# Patient Record
Sex: MALE | Race: WHITE | NOT HISPANIC OR LATINO | Employment: OTHER | URBAN - NONMETROPOLITAN AREA
[De-identification: names, ages, dates, MRNs, and addresses within clinical notes are randomized per-mention and may not be internally consistent; named-entity substitution may affect disease eponyms.]

---

## 2021-02-11 ENCOUNTER — HOSPITAL ENCOUNTER (EMERGENCY)
Facility: HOSPITAL | Age: 36
Discharge: HOME | End: 2021-02-12
Attending: EMERGENCY MEDICINE
Payer: MEDICAID

## 2021-02-11 DIAGNOSIS — K52.9 GASTROENTERITIS: Primary | ICD-10-CM

## 2021-02-11 PROCEDURE — 99284 EMERGENCY DEPT VISIT MOD MDM: CPT | Mod: 25

## 2021-02-12 VITALS
RESPIRATION RATE: 14 BRPM | DIASTOLIC BLOOD PRESSURE: 74 MMHG | BODY MASS INDEX: 27.06 KG/M2 | HEART RATE: 70 BPM | SYSTOLIC BLOOD PRESSURE: 123 MMHG | WEIGHT: 189 LBS | TEMPERATURE: 98 F | OXYGEN SATURATION: 98 % | HEIGHT: 70 IN

## 2021-02-12 LAB
CTP QC/QA: YES
GROUP A STREP, MOLECULAR: NEGATIVE
SARS-COV-2 RDRP RESP QL NAA+PROBE: NEGATIVE

## 2021-02-12 PROCEDURE — 96375 TX/PRO/DX INJ NEW DRUG ADDON: CPT

## 2021-02-12 PROCEDURE — 96361 HYDRATE IV INFUSION ADD-ON: CPT

## 2021-02-12 PROCEDURE — 87651 STREP A DNA AMP PROBE: CPT

## 2021-02-12 PROCEDURE — U0002 COVID-19 LAB TEST NON-CDC: HCPCS | Performed by: EMERGENCY MEDICINE

## 2021-02-12 PROCEDURE — 96374 THER/PROPH/DIAG INJ IV PUSH: CPT

## 2021-02-12 PROCEDURE — 25000003 PHARM REV CODE 250: Performed by: EMERGENCY MEDICINE

## 2021-02-12 PROCEDURE — 63600175 PHARM REV CODE 636 W HCPCS: Performed by: EMERGENCY MEDICINE

## 2021-02-12 RX ORDER — DEXAMETHASONE SODIUM PHOSPHATE 4 MG/ML
4 INJECTION, SOLUTION INTRA-ARTICULAR; INTRALESIONAL; INTRAMUSCULAR; INTRAVENOUS; SOFT TISSUE
Status: COMPLETED | OUTPATIENT
Start: 2021-02-12 | End: 2021-02-12

## 2021-02-12 RX ORDER — IBUPROFEN 600 MG/1
600 TABLET ORAL
Status: COMPLETED | OUTPATIENT
Start: 2021-02-12 | End: 2021-02-12

## 2021-02-12 RX ORDER — ONDANSETRON 2 MG/ML
4 INJECTION INTRAMUSCULAR; INTRAVENOUS
Status: COMPLETED | OUTPATIENT
Start: 2021-02-12 | End: 2021-02-12

## 2021-02-12 RX ORDER — ONDANSETRON 4 MG/1
4 TABLET, ORALLY DISINTEGRATING ORAL EVERY 6 HOURS PRN
Qty: 20 TABLET | Refills: 0 | Status: SHIPPED | OUTPATIENT
Start: 2021-02-12 | End: 2023-06-09

## 2021-02-12 RX ADMIN — SODIUM CHLORIDE 1000 ML: 0.9 INJECTION, SOLUTION INTRAVENOUS at 01:02

## 2021-02-12 RX ADMIN — IBUPROFEN 600 MG: 600 TABLET ORAL at 01:02

## 2021-02-12 RX ADMIN — DEXAMETHASONE SODIUM PHOSPHATE 4 MG: 4 INJECTION, SOLUTION INTRA-ARTICULAR; INTRALESIONAL; INTRAMUSCULAR; INTRAVENOUS; SOFT TISSUE at 01:02

## 2021-02-12 RX ADMIN — ONDANSETRON 4 MG: 2 INJECTION INTRAMUSCULAR; INTRAVENOUS at 01:02

## 2021-03-07 ENCOUNTER — HOSPITAL ENCOUNTER (EMERGENCY)
Facility: HOSPITAL | Age: 36
Discharge: HOME | End: 2021-03-07
Attending: EMERGENCY MEDICINE
Payer: MEDICAID

## 2021-03-07 VITALS
SYSTOLIC BLOOD PRESSURE: 150 MMHG | OXYGEN SATURATION: 98 % | DIASTOLIC BLOOD PRESSURE: 94 MMHG | BODY MASS INDEX: 27.2 KG/M2 | HEIGHT: 70 IN | WEIGHT: 190 LBS | TEMPERATURE: 98 F | RESPIRATION RATE: 18 BRPM | HEART RATE: 101 BPM

## 2021-03-07 DIAGNOSIS — K04.7 DENTAL ABSCESS: Primary | ICD-10-CM

## 2021-03-07 PROCEDURE — 63600175 PHARM REV CODE 636 W HCPCS: Performed by: EMERGENCY MEDICINE

## 2021-03-07 PROCEDURE — 96372 THER/PROPH/DIAG INJ SC/IM: CPT

## 2021-03-07 PROCEDURE — 99284 EMERGENCY DEPT VISIT MOD MDM: CPT | Mod: 25

## 2021-03-07 PROCEDURE — 25000003 PHARM REV CODE 250: Performed by: EMERGENCY MEDICINE

## 2021-03-07 RX ORDER — AMOXICILLIN 250 MG/1
250 CAPSULE ORAL
COMMUNITY
End: 2021-03-07

## 2021-03-07 RX ORDER — DICLOFENAC SODIUM 75 MG/1
75 TABLET, DELAYED RELEASE ORAL 2 TIMES DAILY PRN
Qty: 10 TABLET | Refills: 0 | Status: SHIPPED | OUTPATIENT
Start: 2021-03-07

## 2021-03-07 RX ORDER — CLINDAMYCIN HYDROCHLORIDE 300 MG/1
300 CAPSULE ORAL EVERY 6 HOURS
Qty: 28 CAPSULE | Refills: 0 | Status: SHIPPED | OUTPATIENT
Start: 2021-03-07

## 2021-03-07 RX ORDER — CLINDAMYCIN PHOSPHATE 150 MG/ML
600 INJECTION, SOLUTION INTRAVENOUS
Status: COMPLETED | OUTPATIENT
Start: 2021-03-07 | End: 2021-03-07

## 2021-03-07 RX ORDER — KETOROLAC TROMETHAMINE 30 MG/ML
30 INJECTION, SOLUTION INTRAMUSCULAR; INTRAVENOUS
Status: COMPLETED | OUTPATIENT
Start: 2021-03-07 | End: 2021-03-07

## 2021-03-07 RX ADMIN — CLINDAMYCIN PHOSPHATE 600 MG: 150 INJECTION, SOLUTION INTRAMUSCULAR; INTRAVENOUS at 01:03

## 2021-03-07 RX ADMIN — KETOROLAC TROMETHAMINE 30 MG: 30 INJECTION, SOLUTION INTRAMUSCULAR at 01:03

## 2022-06-14 ENCOUNTER — HOSPITAL ENCOUNTER (OUTPATIENT)
Dept: RADIOLOGY | Facility: HOSPITAL | Age: 37
Discharge: HOME | End: 2022-06-14
Attending: NURSE PRACTITIONER
Payer: MEDICAID

## 2022-06-14 ENCOUNTER — OFFICE VISIT (OUTPATIENT)
Dept: ORTHOPEDICS | Facility: CLINIC | Age: 37
End: 2022-06-14
Payer: MEDICAID

## 2022-06-14 VITALS
HEIGHT: 70 IN | OXYGEN SATURATION: 97 % | BODY MASS INDEX: 28.63 KG/M2 | HEART RATE: 78 BPM | SYSTOLIC BLOOD PRESSURE: 138 MMHG | WEIGHT: 200 LBS | DIASTOLIC BLOOD PRESSURE: 86 MMHG

## 2022-06-14 DIAGNOSIS — G56.21 CUBITAL TUNNEL SYNDROME ON RIGHT: ICD-10-CM

## 2022-06-14 DIAGNOSIS — M25.521 RIGHT ELBOW PAIN: ICD-10-CM

## 2022-06-14 DIAGNOSIS — G89.29 ELBOW PAIN, CHRONIC, RIGHT: Primary | ICD-10-CM

## 2022-06-14 DIAGNOSIS — M25.521 RIGHT ELBOW PAIN: Primary | ICD-10-CM

## 2022-06-14 DIAGNOSIS — M25.521 ELBOW PAIN, CHRONIC, RIGHT: Primary | ICD-10-CM

## 2022-06-14 PROCEDURE — 3008F PR BODY MASS INDEX (BMI) DOCUMENTED: ICD-10-PCS | Mod: CPTII,,, | Performed by: NURSE PRACTITIONER

## 2022-06-14 PROCEDURE — 1159F PR MEDICATION LIST DOCUMENTED IN MEDICAL RECORD: ICD-10-PCS | Mod: CPTII,,, | Performed by: NURSE PRACTITIONER

## 2022-06-14 PROCEDURE — 73080 X-RAY EXAM OF ELBOW: CPT | Mod: TC,RT

## 2022-06-14 PROCEDURE — 99203 PR OFFICE/OUTPT VISIT, NEW, LEVL III, 30-44 MIN: ICD-10-PCS | Mod: S$PBB,,, | Performed by: NURSE PRACTITIONER

## 2022-06-14 PROCEDURE — 99999 PR PBB SHADOW E&M-EST. PATIENT-LVL III: ICD-10-PCS | Mod: PBBFAC,,, | Performed by: NURSE PRACTITIONER

## 2022-06-14 PROCEDURE — 3008F BODY MASS INDEX DOCD: CPT | Mod: CPTII,,, | Performed by: NURSE PRACTITIONER

## 2022-06-14 PROCEDURE — 3075F SYST BP GE 130 - 139MM HG: CPT | Mod: CPTII,,, | Performed by: NURSE PRACTITIONER

## 2022-06-14 PROCEDURE — 99203 OFFICE O/P NEW LOW 30 MIN: CPT | Mod: S$PBB,,, | Performed by: NURSE PRACTITIONER

## 2022-06-14 PROCEDURE — 1160F PR REVIEW ALL MEDS BY PRESCRIBER/CLIN PHARMACIST DOCUMENTED: ICD-10-PCS | Mod: CPTII,,, | Performed by: NURSE PRACTITIONER

## 2022-06-14 PROCEDURE — 1159F MED LIST DOCD IN RCRD: CPT | Mod: CPTII,,, | Performed by: NURSE PRACTITIONER

## 2022-06-14 PROCEDURE — 99213 OFFICE O/P EST LOW 20 MIN: CPT | Mod: PBBFAC | Performed by: NURSE PRACTITIONER

## 2022-06-14 PROCEDURE — 3079F PR MOST RECENT DIASTOLIC BLOOD PRESSURE 80-89 MM HG: ICD-10-PCS | Mod: CPTII,,, | Performed by: NURSE PRACTITIONER

## 2022-06-14 PROCEDURE — 99999 PR PBB SHADOW E&M-EST. PATIENT-LVL III: CPT | Mod: PBBFAC,,, | Performed by: NURSE PRACTITIONER

## 2022-06-14 PROCEDURE — 3079F DIAST BP 80-89 MM HG: CPT | Mod: CPTII,,, | Performed by: NURSE PRACTITIONER

## 2022-06-14 PROCEDURE — 1160F RVW MEDS BY RX/DR IN RCRD: CPT | Mod: CPTII,,, | Performed by: NURSE PRACTITIONER

## 2022-06-14 PROCEDURE — 3075F PR MOST RECENT SYSTOLIC BLOOD PRESS GE 130-139MM HG: ICD-10-PCS | Mod: CPTII,,, | Performed by: NURSE PRACTITIONER

## 2022-06-14 NOTE — PROGRESS NOTES
"Subjective:      Syed Cm is a 36 y.o. male who presents with right elbow pain and numbness in the fourth and fifth digits of the right hand. He is referred by [facility]. By history he had a right Ulna Nerve Transposition done per Dr Murphy around 20 years ago. He states that the elbow did well for years. He states a few years ago, he lifted a gallon of milk and felt instant fire like sensation in his right arm and into the hand. He states that he has had persistent numbness in the fourth and fifth fingers of the right hand since that incident. He denies any loss of elbow or wrist ROM. He denies any neck pain. Current symptoms include: mild elbow pain and persistent nubness in the fourth and fifth fingers. Pain is aggravated by: nothing in particular. Symptoms have gradually worsened. Patient has had prior elbow problems. Evaluation to date: plain films, which showed DJD changes. Treatment to date: OTC analgesics.    The following portions of the patient's history were reviewed and updated as appropriate: allergies, current medications, past family history, past medical history, past social history, past surgical history and problem list.    Review of Systems  Constitutional: negative  Musculoskeletal:negative  Neurological: negative  Behavioral/Psych: negative     Objective:      /86 (BP Location: Right arm, Patient Position: Sitting, BP Method: Large (Automatic))   Pulse 78   Ht 5' 10" (1.778 m)   Wt 90.7 kg (200 lb)   SpO2 97%   BMI 28.70 kg/m²      NVI  Right elbow: No deformity noted, previous scar noted from ulna nerve surgery.   He has full active ROM- ext, flex, supination and pronation.   Mild TTP ulna cubital tunnel.   Non tender over the lateral condyle and olecranon. No bursal swelling.   No instability.   Pos Tinels at elbow  Neg tinels and phalens at wrist  Normal AROM wrist.   Patchy numbness 4/5 fingers  Mild hand grasp weakness.    Left elbow:  without deformity and full active ROM. " Non tender over the condyles and olecranon.   No instability. Normal pronation and supination.   Neg tinels at elbow and wrist.   Normal AROM wrist.   No hand grasp weakness.    Cap refill was brisk.     X-ray: Ordered and reviewed RT elbow films.   No acute fractures. Mild DJD changes noted.     Assessment:     RT elbow pain, cubital tunnel syndrome    Plan:     1. Treatment options discussed: ice, topical Nsaid, rest and elbow sleeve.   2. [facility] RT UE for ulna neuropathy. (Hx previous ulna nerve surgery)  3. Home ROM as discussed- ROM, stretching and ulna nerve glides. Cubital tunnel brace at night time as discussed.   4. RTC post EMG for review.   5. He had no further questions.

## 2022-06-15 ENCOUNTER — TELEPHONE (OUTPATIENT)
Dept: ORTHOPEDICS | Facility: CLINIC | Age: 37
End: 2022-06-15
Payer: MEDICAID

## 2022-06-15 NOTE — TELEPHONE ENCOUNTER
Called pt to let him know that we were able to schedule his EMG @ [facility] for 6/22 @ 7:30am; no answer; left VM

## 2022-06-20 ENCOUNTER — HOSPITAL ENCOUNTER (EMERGENCY)
Facility: HOSPITAL | Age: 37
Discharge: HOME | End: 2022-06-20
Attending: STUDENT IN AN ORGANIZED HEALTH CARE EDUCATION/TRAINING PROGRAM
Payer: MEDICAID

## 2022-06-20 VITALS
DIASTOLIC BLOOD PRESSURE: 90 MMHG | HEART RATE: 83 BPM | WEIGHT: 200 LBS | BODY MASS INDEX: 28.7 KG/M2 | OXYGEN SATURATION: 99 % | SYSTOLIC BLOOD PRESSURE: 140 MMHG | RESPIRATION RATE: 18 BRPM | TEMPERATURE: 98 F

## 2022-06-20 DIAGNOSIS — S39.012A STRAIN OF LUMBAR REGION, INITIAL ENCOUNTER: Primary | ICD-10-CM

## 2022-06-20 PROCEDURE — 96372 THER/PROPH/DIAG INJ SC/IM: CPT | Performed by: NURSE PRACTITIONER

## 2022-06-20 PROCEDURE — 99284 EMERGENCY DEPT VISIT MOD MDM: CPT | Mod: 25

## 2022-06-20 PROCEDURE — 63600175 PHARM REV CODE 636 W HCPCS: Performed by: NURSE PRACTITIONER

## 2022-06-20 RX ORDER — PREDNISONE 20 MG/1
40 TABLET ORAL DAILY
Qty: 10 TABLET | Refills: 0 | Status: SHIPPED | OUTPATIENT
Start: 2022-06-20 | End: 2022-06-25

## 2022-06-20 RX ORDER — CYCLOBENZAPRINE HCL 10 MG
10 TABLET ORAL 3 TIMES DAILY PRN
Qty: 12 TABLET | Refills: 0 | Status: SHIPPED | OUTPATIENT
Start: 2022-06-20 | End: 2022-06-24

## 2022-06-20 RX ORDER — DEXAMETHASONE SODIUM PHOSPHATE 4 MG/ML
8 INJECTION, SOLUTION INTRA-ARTICULAR; INTRALESIONAL; INTRAMUSCULAR; INTRAVENOUS; SOFT TISSUE
Status: COMPLETED | OUTPATIENT
Start: 2022-06-20 | End: 2022-06-20

## 2022-06-20 RX ADMIN — DEXAMETHASONE SODIUM PHOSPHATE 8 MG: 4 INJECTION INTRA-ARTICULAR; INTRALESIONAL; INTRAMUSCULAR; INTRAVENOUS; SOFT TISSUE at 12:06

## 2022-06-20 NOTE — ED PROVIDER NOTES
"Encounter Date: 6/20/2022       History     Chief Complaint   Patient presents with    Back Pain     Complains of lower back pain 10/10 since yesterday. States he was cleaning out the pool and "threw back out." Denies urinary symptoms.     This is a 36-year-old white male with noncontributory past medical history who presents to the emergency department with complaints of lower back pain since yesterday.  Patient reports that while cleaning a pool he began with low back tightness and decreased range of motion.  Patient reports constant lower back pain exacerbated by movement.  He reports that he occasionally experiences something like this a couple times a year .  Patient denies radicular symptoms, numbness/tingling, or bladder/bowel dysfunction.  Pain unrelieved with Tylenol and Aleve.        Review of patient's allergies indicates:  No Known Allergies  Past Medical History:   Diagnosis Date    Dental abscess     Hiatal hernia      Past Surgical History:   Procedure Laterality Date    ULNAR NERVE REPAIR Right      No family history on file.  Social History     Tobacco Use    Smoking status: Never Smoker   Substance Use Topics    Alcohol use: Never    Drug use: Never     Review of Systems   Genitourinary: Negative.    Musculoskeletal: Positive for back pain. Negative for gait problem and neck pain.   Skin: Negative.    Neurological: Negative for numbness.       Physical Exam     Initial Vitals [06/20/22 1229]   BP Pulse Resp Temp SpO2   (!) 140/90 83 18 97.8 °F (36.6 °C) 99 %      MAP       --         Physical Exam    Nursing note and vitals reviewed.  Constitutional: He appears well-developed and well-nourished. He is active. No distress.   HENT:   Head: Normocephalic and atraumatic.   Eyes: EOM are normal. Pupils are equal, round, and reactive to light.   Neck: Neck supple.   Normal range of motion.  Cardiovascular: Normal rate, regular rhythm and normal heart sounds.   Pulmonary/Chest: Breath sounds " normal. No respiratory distress.   Musculoskeletal:         General: No tenderness or edema.      Cervical back: Normal range of motion and neck supple.      Comments: The low back appears normal upon inspection, no rash or discoloration noted.  Patient is nontender in the lumbar paraspinal or central vertebral prominence regions.  Muscle strength to bilateral lower extremities is 5/5 in neurovascularly intact.     Neurological: He is alert and oriented to person, place, and time. He has normal strength. GCS eye subscore is 4. GCS verbal subscore is 5. GCS motor subscore is 6.   Skin: Skin is warm and dry. Capillary refill takes less than 2 seconds.   Psychiatric: He has a normal mood and affect. His behavior is normal. Thought content normal.         ED Course   Procedures  Labs Reviewed - No data to display       Imaging Results    None          Medications   dexamethasone injection 8 mg (has no administration in time range)                          Clinical Impression:   Final diagnoses:  [S39.012A] Strain of lumbar region, initial encounter (Primary)          ED Disposition Condition    Discharge Stable        ED Prescriptions     Medication Sig Dispense Start Date End Date Auth. Provider    predniSONE (DELTASONE) 20 MG tablet Take 2 tablets (40 mg total) by mouth once daily. for 5 days 10 tablet 6/20/2022 6/25/2022 Anastasia Hooks NP    cyclobenzaprine (FLEXERIL) 10 MG tablet Take 1 tablet (10 mg total) by mouth 3 (three) times daily as needed for Muscle spasms (as needed for muscle spasms, low back pain). 12 tablet 6/20/2022 6/24/2022 Anastasia Hooks NP        Follow-up Information     Follow up With Specialties Details Why Contact Info    PCP Follow UP  Schedule an appointment as soon as possible for a visit in 2 days for follow-up, for re-evaluation of today's complaint            Anastasia Hooks NP  06/20/22 5650

## 2022-06-20 NOTE — DISCHARGE INSTRUCTIONS
Take medications as directed, and you may also take Aleve and Tylenol as directed.  Follow-up with your primary doctor in 1 week and return to the emergency room for worsening condition.  And return to the emergency room for worsening condition

## 2022-07-05 ENCOUNTER — HOSPITAL ENCOUNTER (EMERGENCY)
Facility: HOSPITAL | Age: 37
Discharge: HOME | End: 2022-07-05
Attending: EMERGENCY MEDICINE
Payer: MEDICAID

## 2022-07-05 VITALS
OXYGEN SATURATION: 99 % | SYSTOLIC BLOOD PRESSURE: 150 MMHG | RESPIRATION RATE: 16 BRPM | WEIGHT: 197 LBS | TEMPERATURE: 98 F | HEART RATE: 70 BPM | BODY MASS INDEX: 28.27 KG/M2 | DIASTOLIC BLOOD PRESSURE: 98 MMHG

## 2022-07-05 DIAGNOSIS — R51.9 NONINTRACTABLE HEADACHE, UNSPECIFIED CHRONICITY PATTERN, UNSPECIFIED HEADACHE TYPE: Primary | ICD-10-CM

## 2022-07-05 PROCEDURE — 99284 EMERGENCY DEPT VISIT MOD MDM: CPT | Mod: 25

## 2022-07-05 PROCEDURE — 96372 THER/PROPH/DIAG INJ SC/IM: CPT | Performed by: CLINICAL NURSE SPECIALIST

## 2022-07-05 PROCEDURE — 63600175 PHARM REV CODE 636 W HCPCS: Performed by: CLINICAL NURSE SPECIALIST

## 2022-07-05 RX ORDER — DIPHENHYDRAMINE HYDROCHLORIDE 50 MG/ML
25 INJECTION INTRAMUSCULAR; INTRAVENOUS ONCE
Status: COMPLETED | OUTPATIENT
Start: 2022-07-05 | End: 2022-07-05

## 2022-07-05 RX ORDER — IBUPROFEN 800 MG/1
800 TABLET ORAL EVERY 6 HOURS PRN
Qty: 20 TABLET | Refills: 0 | Status: SHIPPED | OUTPATIENT
Start: 2022-07-05

## 2022-07-05 RX ORDER — PROCHLORPERAZINE EDISYLATE 5 MG/ML
10 INJECTION INTRAMUSCULAR; INTRAVENOUS ONCE
Status: COMPLETED | OUTPATIENT
Start: 2022-07-05 | End: 2022-07-05

## 2022-07-05 RX ORDER — KETOROLAC TROMETHAMINE 30 MG/ML
60 INJECTION, SOLUTION INTRAMUSCULAR; INTRAVENOUS ONCE
Status: COMPLETED | OUTPATIENT
Start: 2022-07-05 | End: 2022-07-05

## 2022-07-05 RX ADMIN — KETOROLAC TROMETHAMINE 60 MG: 30 INJECTION, SOLUTION INTRAMUSCULAR at 08:07

## 2022-07-05 RX ADMIN — DIPHENHYDRAMINE HYDROCHLORIDE 25 MG: 50 INJECTION, SOLUTION INTRAMUSCULAR; INTRAVENOUS at 08:07

## 2022-07-05 RX ADMIN — PROCHLORPERAZINE EDISYLATE 10 MG: 5 INJECTION INTRAMUSCULAR; INTRAVENOUS at 08:07

## 2022-07-06 NOTE — ED PROVIDER NOTES
Encounter Date: 7/5/2022       History     Chief Complaint   Patient presents with    Headache     Headache x 2 weeks, temporal and behind eyes. States he has been taking around 10 BC powders per day with mild relief.      Syed Cm is an 37 y.o. male who complains of headache for the last 2 weeks.  Patient has been taking multiple doses a BC powder with no relief.  Patient states has a history of migraines as a child.        Review of patient's allergies indicates:  No Known Allergies  Past Medical History:   Diagnosis Date    Dental abscess     Hiatal hernia      Past Surgical History:   Procedure Laterality Date    ULNAR NERVE REPAIR Right      No family history on file.  Social History     Tobacco Use    Smoking status: Never Smoker   Substance Use Topics    Alcohol use: Never    Drug use: Never     Review of Systems   Constitutional: Negative for fever.   HENT: Negative for sore throat.    Respiratory: Negative for shortness of breath.    Cardiovascular: Negative for chest pain.   Gastrointestinal: Negative for nausea.   Genitourinary: Negative for dysuria.   Musculoskeletal: Negative for back pain.   Skin: Negative for rash.   Neurological: Positive for headaches. Negative for weakness.   Hematological: Does not bruise/bleed easily.   All other systems reviewed and are negative.      Physical Exam     Initial Vitals [07/05/22 2040]   BP Pulse Resp Temp SpO2   (!) 165/108 76 18 98 °F (36.7 °C) 99 %      MAP       --         Physical Exam    Nursing note and vitals reviewed.  Constitutional: He appears well-developed and well-nourished.   HENT:   Head: Normocephalic and atraumatic.   Right Ear: Tympanic membrane is bulging.   Left Ear: Tympanic membrane is bulging.   Eyes: Pupils are equal, round, and reactive to light.   Cardiovascular: Normal rate and regular rhythm.   Pulmonary/Chest: Breath sounds normal.   Abdominal: Abdomen is soft. Bowel sounds are normal.   Musculoskeletal:         General:  Normal range of motion.     Neurological: He is alert and oriented to person, place, and time.   Psychiatric: He has a normal mood and affect.         ED Course   Procedures  Labs Reviewed - No data to display       Imaging Results    None          Medications   diphenhydrAMINE injection 25 mg (25 mg Intramuscular Given 7/5/22 2051)   prochlorperazine injection Soln 10 mg (10 mg Intramuscular Given 7/5/22 2051)   ketorolac injection 60 mg (60 mg Intramuscular Given 7/5/22 2051)     Medical Decision Making:   Differential Diagnosis:   Headache, migraine                      Clinical Impression:   Final diagnoses:  [R51.9] Nonintractable headache, unspecified chronicity pattern, unspecified headache type (Primary)          ED Disposition Condition    Discharge Stable        ED Prescriptions     Medication Sig Dispense Start Date End Date Auth. Provider    ibuprofen (ADVIL,MOTRIN) 800 MG tablet Take 1 tablet (800 mg total) by mouth every 6 (six) hours as needed for Pain. 20 tablet 7/5/2022  Kaitlin Poole NP        Follow-up Information    None          Kaitlin Poole NP  07/05/22 2056

## 2022-07-09 ENCOUNTER — HOSPITAL ENCOUNTER (EMERGENCY)
Facility: HOSPITAL | Age: 37
Discharge: HOME | End: 2022-07-09
Attending: EMERGENCY MEDICINE
Payer: MEDICAID

## 2022-07-09 VITALS
DIASTOLIC BLOOD PRESSURE: 90 MMHG | SYSTOLIC BLOOD PRESSURE: 161 MMHG | RESPIRATION RATE: 18 BRPM | HEART RATE: 64 BPM | BODY MASS INDEX: 27.92 KG/M2 | TEMPERATURE: 98 F | OXYGEN SATURATION: 99 % | HEIGHT: 70 IN | WEIGHT: 195 LBS

## 2022-07-09 DIAGNOSIS — S50.11XA CONTUSION OF RIGHT FOREARM, INITIAL ENCOUNTER: ICD-10-CM

## 2022-07-09 DIAGNOSIS — S49.91XA INJURY OF RIGHT UPPER EXTREMITY, INITIAL ENCOUNTER: Primary | ICD-10-CM

## 2022-07-09 PROCEDURE — 63600175 PHARM REV CODE 636 W HCPCS: Performed by: EMERGENCY MEDICINE

## 2022-07-09 PROCEDURE — 99284 EMERGENCY DEPT VISIT MOD MDM: CPT | Mod: 25

## 2022-07-09 PROCEDURE — 25000003 PHARM REV CODE 250: Performed by: EMERGENCY MEDICINE

## 2022-07-09 PROCEDURE — 29125 APPL SHORT ARM SPLINT STATIC: CPT | Mod: RT

## 2022-07-09 PROCEDURE — 96372 THER/PROPH/DIAG INJ SC/IM: CPT | Performed by: EMERGENCY MEDICINE

## 2022-07-09 RX ORDER — HYDROCODONE BITARTRATE AND ACETAMINOPHEN 5; 325 MG/1; MG/1
1 TABLET ORAL
Status: COMPLETED | OUTPATIENT
Start: 2022-07-09 | End: 2022-07-09

## 2022-07-09 RX ORDER — NAPROXEN 500 MG/1
500 TABLET ORAL EVERY 12 HOURS PRN
Qty: 20 TABLET | Refills: 0 | Status: SHIPPED | OUTPATIENT
Start: 2022-07-09

## 2022-07-09 RX ORDER — KETOROLAC TROMETHAMINE 30 MG/ML
15 INJECTION, SOLUTION INTRAMUSCULAR; INTRAVENOUS
Status: COMPLETED | OUTPATIENT
Start: 2022-07-09 | End: 2022-07-09

## 2022-07-09 RX ADMIN — HYDROCODONE BITARTRATE AND ACETAMINOPHEN 1 TABLET: 5; 325 TABLET ORAL at 11:07

## 2022-07-09 RX ADMIN — KETOROLAC TROMETHAMINE 15 MG: 30 INJECTION, SOLUTION INTRAMUSCULAR; INTRAVENOUS at 11:07

## 2022-07-10 NOTE — ED PROVIDER NOTES
EMERGENCY DEPARTMENT HISTORY AND PHYSICAL EXAM          Date: 7/9/2022   Patient Name: Syed Cm       History of Presenting Illness           Chief Complaint   Patient presents with    Arm Injury     Pt states he was jacking up a house when the jack kicked out and a pipe hit his rt arm.           History Provided By: Patient    2308   Syed Cm is a 37 y.o. male with PMHX of no significant history who presents to the emergency department C/O arm pain.    Patient was at work check in about house when to check it doubt and a metal pipe hit his right forearm.  Reports immediate pain and arm.  Patient stated work throughout the day as he is working in [place].  Comes this evening due to continuing pain and arm.  Reports difficulty  pronating and supinating for      PCP: Primary Doctor No        No current facility-administered medications for this encounter.     Current Outpatient Medications   Medication Sig Dispense Refill    clindamycin (CLEOCIN) 300 MG capsule Take 1 capsule (300 mg total) by mouth every 6 (six) hours. (Patient not taking: Reported on 6/14/2022) 28 capsule 0    diclofenac (VOLTAREN) 75 MG EC tablet Take 1 tablet (75 mg total) by mouth 2 (two) times daily as needed (pain). (Patient not taking: Reported on 6/14/2022) 10 tablet 0    ibuprofen (ADVIL,MOTRIN) 800 MG tablet Take 1 tablet (800 mg total) by mouth every 6 (six) hours as needed for Pain. 20 tablet 0    naproxen (NAPROSYN) 500 MG tablet Take 1 tablet (500 mg total) by mouth every 12 (twelve) hours as needed (Pain). 20 tablet 0    ondansetron (ZOFRAN-ODT) 4 MG TbDL Take 1 tablet (4 mg total) by mouth every 6 (six) hours as needed. (Patient not taking: Reported on 6/14/2022) 20 tablet 0           Past History     Past Medical History:   Past Medical History:   Diagnosis Date    Dental abscess     Hiatal hernia         Past Surgical History:   Past Surgical History:   Procedure Laterality Date    ULNAR NERVE REPAIR  "Right         Family History:   History reviewed. No pertinent family history.     Social History:   Social History     Tobacco Use    Smoking status: Never Smoker    Smokeless tobacco: Never Used   Substance Use Topics    Alcohol use: Never    Drug use: Never        Allergies:   Review of patient's allergies indicates:  No Known Allergies       Review of Systems   Review of Systems   Musculoskeletal: Positive for arthralgias and myalgias.   All other systems reviewed and are negative.                 Physical Exam     Vitals:    07/09/22 2239 07/09/22 2313   BP: (!) 161/90    Pulse: 64    Resp: 16 18   Temp: 97.6 °F (36.4 °C)    TempSrc: Oral    SpO2: 99%    Weight: 88.5 kg (195 lb)    Height: 5' 10" (1.778 m)       Physical Exam  Vitals and nursing note reviewed.   Constitutional:       General: He is not in acute distress.     Appearance: Normal appearance. He is well-developed. He is not ill-appearing.   HENT:      Head: Normocephalic and atraumatic.   Eyes:      Extraocular Movements: Extraocular movements intact.      Conjunctiva/sclera: Conjunctivae normal.   Pulmonary:      Effort: Pulmonary effort is normal. No respiratory distress.   Musculoskeletal:         General: No deformity or signs of injury. Normal range of motion.      Right elbow: Normal.      Right forearm: Swelling, tenderness and bony tenderness present. No deformity.      Right wrist: Normal.      Cervical back: Normal range of motion. No rigidity.      Comments: There is swelling and hematoma over the distal 3rd of patient's forearm, tenderness over radius, patient reports difficulty pronating and supinating arm secondary to pain.  Full range of motion of right wrist.  Normal distal pulses.   Skin:     General: Skin is dry.      Coloration: Skin is not pale.      Findings: No rash.   Neurological:      General: No focal deficit present.      Mental Status: He is alert and oriented to person, place, and time.      Cranial Nerves: No " cranial nerve deficit.      Motor: No weakness.      Coordination: Coordination normal.   Psychiatric:         Mood and Affect: Mood normal.         Behavior: Behavior normal.              Diagnostic Study Results      Labs -   No results found for this or any previous visit (from the past 12 hour(s)).     Radiologic Studies -    X-Ray Forearm Right    (Results Pending)        Medications given in the ED-   Medications   ketorolac injection 15 mg (15 mg Intramuscular Given 7/9/22 2313)   HYDROcodone-acetaminophen 5-325 mg per tablet 1 tablet (1 tablet Oral Given 7/9/22 2313)           Medical Decision Making    I am the first provider for this patient.     I reviewed the vital signs, available nursing notes, past medical history, past surgical history, family history and social history.     Vital Signs:  Reviewed the patient's vital signs.     Pulse Oximetry Analysis and Interpretation:    99% on Room Air, normal      Records Reviewed: Nursing notes.        Provider Notes (Medical Decision Making): Syed Cm is a 37 y.o. male here with arm injury after being hit by a metal pole.    Patient with hematoma right forearm limited motion, no gross deformity.    Reviewed radiographs which were negative for acute bony injury.  Patient compartments soft with hematoma.  Advise symptom management, compression, and ice for bone bruise.       Procedures:   Procedures      ED Course:               Diagnosis and Disposition     Critical Care:      DISCHARGE NOTE:       Syed Cm's  results have been reviewed with him.  He has been counseled regarding his diagnosis, treatment, and plan.  He verbally conveys understanding and agreement of the signs, symptoms, diagnosis, treatment and prognosis and additionally agrees to follow up as discussed.  He also agrees with the care-plan and conveys that all of his questions have been answered.  I have also provided discharge instructions for him that include: educational  information regarding their diagnosis and treatment, and list of reasons why they would want to return to the ED prior to their follow-up appointment, should his condition change. He has been provided with education for proper emergency department utilization.         CLINICAL IMPRESSION:           1. Injury of right upper extremity, initial encounter    2. Contusion of right forearm, initial encounter              PLAN:   1. Discharge Home  2.      Medication List      START taking these medications    naproxen 500 MG tablet  Commonly known as: NAPROSYN  Take 1 tablet (500 mg total) by mouth every 12 (twelve) hours as needed (Pain).        ASK your doctor about these medications    clindamycin 300 MG capsule  Commonly known as: CLEOCIN  Take 1 capsule (300 mg total) by mouth every 6 (six) hours.     diclofenac 75 MG EC tablet  Commonly known as: VOLTAREN  Take 1 tablet (75 mg total) by mouth 2 (two) times daily as needed (pain).     ibuprofen 800 MG tablet  Commonly known as: ADVIL,MOTRIN  Take 1 tablet (800 mg total) by mouth every 6 (six) hours as needed for Pain.     ondansetron 4 MG Tbdl  Commonly known as: ZOFRAN-ODT  Take 1 tablet (4 mg total) by mouth every 6 (six) hours as needed.           Where to Get Your Medications      These medications were sent to [id number] - [facility] [address] [place] [address] AT [address] & [address] [address]    Phone: [phone]   · naproxen 500 MG tablet        [facility]. [facility] - Emergency Department  [facility] [facility] [id number] [phone]  Go to   If symptoms worsen    [facility]  [address] [facility]  [address]  [facility] [address]  [phone]    Schedule an appointment as soon as possible for a visit   If symptoms worsen       _______________________________     Please note that this dictation was completed with [email or web address], the computer voice recognition software.   Quite often unanticipated grammatical, syntax, homophones, and other interpretive errors are inadvertently transcribed by the computer software.  Please disregard these errors.  Please excuse any errors that have escaped final proofreading.             Noah Murdock MD  07/09/22 3954

## 2022-07-10 NOTE — ED NOTES
Pt was using a panda to move a house approx 6 hpurs pta and the panda slipped and hit his right forearm. Obvious swelling noted to area. Motor and sensation intact.

## 2022-07-13 ENCOUNTER — PATIENT OUTREACH (OUTPATIENT)
Dept: EMERGENCY MEDICINE | Facility: HOSPITAL | Age: 37
End: 2022-07-13
Payer: MEDICAID

## 2022-07-13 NOTE — PROGRESS NOTES
Tiffanie Lopez, Patient Care Assistant  ED Navigator  Emergency Department    Project: [facility] ED Navigator  Role: Community Health Worker    Date: 07/13/2022  Patient Name: Syed Cm  MRN: [id number]  PCP: Isabel Kruse MD    Assessment:     Syed Cm is a 37 y.o. male who has presented to ED for an arm injury. Patient has visited the ED 3 times in the past 3 months. Patient did contact PCP.     ED Navigator Initial Assessment    ED Navigator Enrollment Documentation  Consent to Services  Does patient consent to completing the assessment?: Yes  Contact  Method of Initial Contact: Phone  Transportation  Does the patient have issues with Transportation?: No  Does the patient have transportation to and from healthcare appointments?: Yes  Insurance Coverage  Do you have coverage/adequate coverage?: Yes  Type/kind of coverage: [facility] COMMUNITY PLAN [facility] ([place]ICA[place])  Is patient able to afford co-pays/deductibles?: Yes  Is patient able to afford HME or supplies?: Yes  Does patient have an established [facility] PCP?: Yes  Able to access?: Yes  Does the patient have a lack of adequate coverage?: No  Specialist Appointment  Did the patient come to the ED to see a specialist?: No  Does the patient have a pending specialist referral?: Yes  Does the patient have a specialist appointment made?: Yes  Is the patient able to access a timely specialist appointment?: Yes  PCP Follow Up Appointment  Has the patient had an appointment with a primary care provider in the past year?: Yes  Approximate date: 7/12/22  Provider: Isabel Kruse MD  Does the patient have a follow up appontment with a PCP?: No  When was the last time you saw your PCP?: 7/12/22  Why does the patient not have a follow up scheduled?: Other (see comments) (Comment: Patient already attended a follow-up appointment.)  Medications  Is patient able to afford medication?: Yes  Is patient unable to get medication due to lack of transportation?:  No  Psychological  Does the patient have psycho-social concerns?: No  Food  Does the patient have concerns about food?: No  Communication/Education  Does the patient have limited English proficiency/English not primary language?: No  Does patient have low literacy and/or low health literacy?: Yes  Does patient have concerns with care?: No  Does patient have dissatisfaction with care?: No  Other Financial Concerns  Does the patient have immediate financial distress?: No  Does the patient have general financial concerns?: No  Other Social Barriers/Concerns  Does the patient have any additional barriers or concerns?: None  Primary Barrier  Barriers identified: Cognitive barrier (health literacy, language and communication, etc.)  Root Cause of ED Utilization: Chronic Conditions  Plan to address Chronic Conditions: Encourage patient to contact PCP/specialist for follow up per ED discharge instructions  Next steps: Provided Education  Was education/educational materials provided surrounding PCP services/creating a medical home?: Yes Was education verbal or written?: Written   Was education/educational materials provided surrounding low cost, healthy foods?: No    Was education/educational materials provided surrounding other items? If so, use comment to explain.: Yes Was education verbal or written?: Written   Plan: Provided information for [facility] 24/7 Nurse triage line, [phone] or [facility] ([phone])  Expected Date of Follow Up 1: 7/29/22  Additional Documentation: Patient stated he does have a PCP, whom is Isabel Kruse MD. Patient expressed he seen her yesterday and additionally has an orthopedic appointment for tomorrow. Patient identified that he has no needs for resources at this time. Patient was nice, and thankful for being followed-up with.     ED navigator corrected patient's PCP in [facility], as [facility] listed he did not have one. ED navigator ensured patient did not need assistance with  scheduling any appointments at this time. ED navigator ensured patient did not need any resources at this time, either. ED navigator provided patient with the following at his e-mail address listed in [employer]: the [facility] 24/7 Nurse triage line, [phone] or [facility] ([phone]) contact information, education to choose the Right level of care at the Right place, and education on [facility]'s Virtual visit program. ED navigator will follow-up with patient on/around 7/29/2022 to see how his orthopedic appointment went, and to assist as needed.    Tiffanie Lopez  ED Navigator- [place]          Social History     Socioeconomic History    Marital status: Single   Tobacco Use    Smoking status: Never Smoker    Smokeless tobacco: Never Used   Substance and Sexual Activity    Alcohol use: Never    Drug use: Never    Sexual activity: Not Currently     Social Determinants of Health     Financial Resource Strain: Low Risk     Difficulty of Paying Living Expenses: Not very hard   Food Insecurity: No Food Insecurity    Worried About Running Out of Food in the Last Year: Never true    Ran Out of Food in the Last Year: Never true   Transportation Needs: No Transportation Needs    Lack of Transportation (Medical): No    Lack of Transportation (Non-Medical): No   Physical Activity: Sufficiently Active    Days of Exercise per Week: 5 days    Minutes of Exercise per Session: 150+ min   Stress: Stress Concern Present    Feeling of Stress : Very much   Social Connections: Moderately Isolated    Frequency of Communication with Friends and Family: More than three times a week    Frequency of Social Gatherings with Friends and Family: More than three times a week    Attends Holiness Services: Never    Active Member of Clubs or Organizations: No    Attends Club or Organization Meetings: Never    Marital Status: Living with partner   Housing Stability: Low Risk     Unable to Pay for Housing in  the Last Year: No    Number of Places Lived in the Last Year: 1    Unstable Housing in the Last Year: No       Plan:       Patient stated he does have a PCP, whom is Isabel Kruse MD. Patient expressed he seen her yesterday and additionally has an orthopedic appointment for tomorrow. Patient identified that he has no needs for resources at this time. Patient was nice, and thankful for being followed-up with.     ED navigator corrected patient's PCP in [facility], as [facility] listed he did not have one. ED navigator ensured patient did not need assistance with scheduling any appointments at this time. ED navigator ensured patient did not need any resources at this time, either. ED navigator provided patient with the following at his e-mail address listed in [employer]: the [facility] 24/7 Nurse triage line, [phone] or [facility] ([phone]) contact information, education to choose the Right level of care at the Right place, and education on [facility]'s Virtual visit program. ED navigator will follow-up with patient on/around 7/29/2022 to see how his orthopedic appointment went, and to assist as needed.    Tiffanie Lopez  ED Navigator- [place]

## 2022-07-14 ENCOUNTER — OFFICE VISIT (OUTPATIENT)
Dept: ORTHOPEDICS | Facility: CLINIC | Age: 37
End: 2022-07-14
Payer: MEDICAID

## 2022-07-14 VITALS
HEIGHT: 70 IN | BODY MASS INDEX: 27.92 KG/M2 | OXYGEN SATURATION: 98 % | SYSTOLIC BLOOD PRESSURE: 136 MMHG | WEIGHT: 195 LBS | HEART RATE: 73 BPM | DIASTOLIC BLOOD PRESSURE: 87 MMHG

## 2022-07-14 DIAGNOSIS — S50.11XA CONTUSION OF RIGHT FOREARM, INITIAL ENCOUNTER: ICD-10-CM

## 2022-07-14 DIAGNOSIS — M62.838 MUSCLE SPASM: Primary | ICD-10-CM

## 2022-07-14 PROCEDURE — 1159F MED LIST DOCD IN RCRD: CPT | Mod: CPTII,,, | Performed by: NURSE PRACTITIONER

## 2022-07-14 PROCEDURE — 3079F PR MOST RECENT DIASTOLIC BLOOD PRESSURE 80-89 MM HG: ICD-10-PCS | Mod: CPTII,,, | Performed by: NURSE PRACTITIONER

## 2022-07-14 PROCEDURE — 99213 PR OFFICE/OUTPT VISIT, EST, LEVL III, 20-29 MIN: ICD-10-PCS | Mod: S$PBB,,, | Performed by: NURSE PRACTITIONER

## 2022-07-14 PROCEDURE — 3075F SYST BP GE 130 - 139MM HG: CPT | Mod: CPTII,,, | Performed by: NURSE PRACTITIONER

## 2022-07-14 PROCEDURE — 99999 PR PBB SHADOW E&M-EST. PATIENT-LVL III: CPT | Mod: PBBFAC,,, | Performed by: NURSE PRACTITIONER

## 2022-07-14 PROCEDURE — 3075F PR MOST RECENT SYSTOLIC BLOOD PRESS GE 130-139MM HG: ICD-10-PCS | Mod: CPTII,,, | Performed by: NURSE PRACTITIONER

## 2022-07-14 PROCEDURE — 99213 OFFICE O/P EST LOW 20 MIN: CPT | Mod: S$PBB,,, | Performed by: NURSE PRACTITIONER

## 2022-07-14 PROCEDURE — 1160F RVW MEDS BY RX/DR IN RCRD: CPT | Mod: CPTII,,, | Performed by: NURSE PRACTITIONER

## 2022-07-14 PROCEDURE — 1160F PR REVIEW ALL MEDS BY PRESCRIBER/CLIN PHARMACIST DOCUMENTED: ICD-10-PCS | Mod: CPTII,,, | Performed by: NURSE PRACTITIONER

## 2022-07-14 PROCEDURE — 3079F DIAST BP 80-89 MM HG: CPT | Mod: CPTII,,, | Performed by: NURSE PRACTITIONER

## 2022-07-14 PROCEDURE — 3008F PR BODY MASS INDEX (BMI) DOCUMENTED: ICD-10-PCS | Mod: CPTII,,, | Performed by: NURSE PRACTITIONER

## 2022-07-14 PROCEDURE — 3008F BODY MASS INDEX DOCD: CPT | Mod: CPTII,,, | Performed by: NURSE PRACTITIONER

## 2022-07-14 PROCEDURE — 99999 PR PBB SHADOW E&M-EST. PATIENT-LVL III: ICD-10-PCS | Mod: PBBFAC,,, | Performed by: NURSE PRACTITIONER

## 2022-07-14 PROCEDURE — 1159F PR MEDICATION LIST DOCUMENTED IN MEDICAL RECORD: ICD-10-PCS | Mod: CPTII,,, | Performed by: NURSE PRACTITIONER

## 2022-07-14 PROCEDURE — 99213 OFFICE O/P EST LOW 20 MIN: CPT | Mod: PBBFAC | Performed by: NURSE PRACTITIONER

## 2022-07-14 RX ORDER — METHOCARBAMOL 500 MG/1
500 TABLET, FILM COATED ORAL 4 TIMES DAILY
Qty: 40 TABLET | Refills: 0 | Status: SHIPPED | OUTPATIENT
Start: 2022-07-14 | End: 2022-07-24

## 2022-07-14 NOTE — PROGRESS NOTES
"Subjective:      Syed Cm is an 37 y.o. male who presents for a new problem. He is here for evaluation of right forearm pain. He reports date of injury was on 07/09/22. He states that he was jacking up a camp and the panda rolled over. He states that his right forearm was pinned between the panda and a pipe. He was able to free his arm but had immediate pain and soon after swelling in the forearm. He was brought to the [facility] ER where radiographs were negative of the forearm. He was diagnosed with a contusion/hematoma of the right forearm. He was splinted and instructed to see orthopedics. He presents and rates his pain as a 7/10. The pain is moderate, worsens with movement, and is relieved by bracing. He is complaining of spasm like discomfort in the forearm and his hand. There is associated numbness in right hand but has a history of cubital tunnel and is scheduled for an upcoming EMG RT UE next week at [facility].     The following portions of the patient's history were reviewed and updated as appropriate: allergies, current medications, past family history, past medical history, past social history, past surgical history and problem list.    Review of Systems  Constitutional: negative  Musculoskeletal:positive for arthralgias  Neurological: negative  Behavioral/Psych: negative     Objective:      /87 (BP Location: Right arm, Patient Position: Sitting, BP Method: Medium (Automatic))   Pulse 73   Ht 5' 10" (1.778 m)   Wt 88.5 kg (195 lb)   SpO2 98%   BMI 27.98 kg/m²      Right wrist:  Inspection reveals mild swelling RT distal forearm with mild associated bruising. Consistent with a hematoma.   Soft tissue tenderness to palpation in area of injury. (distal third forearm)  Normal  range of motion of wrist and elbow.   There is no deformity noted of the forearm.   Sensation normal   Left wrist:  normal exam, no swelling, tenderness, instability; ligaments intact, full ROM both hands, wrists, and finger " joints     Imaging:  X-ray :Previous radiographs RT forearm was reviewed from 07/09/22.   No fracture or dislocation.  No focal soft tissue abnormality.    Assessment:     RT forearm contusion/resolving hematoma    Plan:     1. Natural history and expected course discussed.   2. Ice as directed. ACE applied and elevate. Placed in wrist brace for joint support and rest. Instructed on usage.   3. Tylenol and Duexis bid as directed as needed. Rx- Robaxin 500 mg QID prn spasms.   4. RTC in 2-3 weeks for follow up and also has upcoming EMG RT UE.   5. Questions answered.

## 2022-07-29 ENCOUNTER — PATIENT OUTREACH (OUTPATIENT)
Dept: EMERGENCY MEDICINE | Facility: HOSPITAL | Age: 37
End: 2022-07-29
Payer: MEDICAID

## 2022-07-29 NOTE — PROGRESS NOTES
Patient stated his orthopedic appointment went well and was scheduled for an EMG RT UE [place] [facility], but could not make it, due to his wife having medical issues of her own. Patient stated he will reschedule it and has no need for assistance during this time.     ED navigator inquired how patient's orthopedic appointment went and ensured he did not need assistance rescheduling with [facility]. ED navigator will follow-up with patient on/around 9/9/2022.    Tiffanie Lopez  ED Navigator- [place]

## 2022-09-09 ENCOUNTER — PATIENT OUTREACH (OUTPATIENT)
Dept: EMERGENCY MEDICINE | Facility: HOSPITAL | Age: 37
End: 2022-09-09
Payer: MEDICAID

## 2022-09-09 NOTE — PROGRESS NOTES
Patient stated he did not get to reschedule his EMG at [facility] yet. Patient's wife has been having many medical issues, as well as going through therapy, so he kind of put his medical needs to the side to help her for now. Patient did not need any assistance with anything during this time.    ED navigator inquired if patient was able to get his EMG rescheduled and ensured he was not having any troubles to do so. ED navigator ensured there was nothing she could help patient with. ED navigator will follow-up with patient on/around 11/4/2022.    Tiffanie Lopez  ED Navigator- [place]

## 2022-10-10 ENCOUNTER — HOSPITAL ENCOUNTER (EMERGENCY)
Facility: HOSPITAL | Age: 37
Discharge: HOME | End: 2022-10-10
Attending: STUDENT IN AN ORGANIZED HEALTH CARE EDUCATION/TRAINING PROGRAM
Payer: MEDICAID

## 2022-10-10 VITALS
DIASTOLIC BLOOD PRESSURE: 97 MMHG | HEIGHT: 70 IN | HEART RATE: 79 BPM | SYSTOLIC BLOOD PRESSURE: 149 MMHG | TEMPERATURE: 98 F | RESPIRATION RATE: 17 BRPM | WEIGHT: 194 LBS | OXYGEN SATURATION: 96 % | BODY MASS INDEX: 27.77 KG/M2

## 2022-10-10 DIAGNOSIS — M79.5 FOREIGN BODY (FB) IN SOFT TISSUE: Primary | ICD-10-CM

## 2022-10-10 PROCEDURE — 63600175 PHARM REV CODE 636 W HCPCS: Performed by: CLINICAL NURSE SPECIALIST

## 2022-10-10 PROCEDURE — 25000003 PHARM REV CODE 250: Performed by: CLINICAL NURSE SPECIALIST

## 2022-10-10 PROCEDURE — 99284 EMERGENCY DEPT VISIT MOD MDM: CPT | Mod: 25

## 2022-10-10 PROCEDURE — 96372 THER/PROPH/DIAG INJ SC/IM: CPT | Performed by: STUDENT IN AN ORGANIZED HEALTH CARE EDUCATION/TRAINING PROGRAM

## 2022-10-10 PROCEDURE — 96375 TX/PRO/DX INJ NEW DRUG ADDON: CPT

## 2022-10-10 PROCEDURE — 63600175 PHARM REV CODE 636 W HCPCS: Performed by: STUDENT IN AN ORGANIZED HEALTH CARE EDUCATION/TRAINING PROGRAM

## 2022-10-10 PROCEDURE — 96365 THER/PROPH/DIAG IV INF INIT: CPT

## 2022-10-10 RX ORDER — CEPHALEXIN 500 MG/1
500 CAPSULE ORAL EVERY 12 HOURS
Qty: 14 CAPSULE | Refills: 0 | Status: SHIPPED | OUTPATIENT
Start: 2022-10-10 | End: 2022-10-17

## 2022-10-10 RX ORDER — CEFAZOLIN SODIUM 1 G/3ML
1 INJECTION, POWDER, FOR SOLUTION INTRAMUSCULAR; INTRAVENOUS
Status: COMPLETED | OUTPATIENT
Start: 2022-10-10 | End: 2022-10-10

## 2022-10-10 RX ORDER — HYDROMORPHONE HYDROCHLORIDE 2 MG/ML
2 INJECTION, SOLUTION INTRAMUSCULAR; INTRAVENOUS; SUBCUTANEOUS ONCE
Status: COMPLETED | OUTPATIENT
Start: 2022-10-10 | End: 2022-10-10

## 2022-10-10 RX ADMIN — HYDROMORPHONE HYDROCHLORIDE 2 MG: 2 INJECTION, SOLUTION INTRAMUSCULAR; INTRAVENOUS; SUBCUTANEOUS at 02:10

## 2022-10-10 RX ADMIN — CEFAZOLIN 1 G: 330 INJECTION, POWDER, FOR SOLUTION INTRAMUSCULAR; INTRAVENOUS at 04:10

## 2022-10-10 RX ADMIN — PROMETHAZINE HYDROCHLORIDE 25 MG: 25 INJECTION INTRAMUSCULAR; INTRAVENOUS at 02:10

## 2022-10-10 NOTE — Clinical Note
"Syed Morelony" Soila was seen and treated in our emergency department on 10/10/2022.  He may return to work on 10/12/2022.       If you have any questions or concerns, please don't hesitate to call.      Vaibhav Abreu MD"

## 2022-10-10 NOTE — ED PROVIDER NOTES
Encounter Date: 10/10/2022       History     Chief Complaint   Patient presents with    Foreign Body     Pt was using nail gun PTA and accidentally got nail shot into left index and came out middle finger.  Pt is UTD with TD vaccine.     37-year-old male with no significant past medical history presents with no gun injury to left hand involving index and middle finger.  Tetanus up-to-date.  Denies any other injuries    Review of patient's allergies indicates:  No Known Allergies  Past Medical History:   Diagnosis Date    Dental abscess     Hiatal hernia      Past Surgical History:   Procedure Laterality Date    ULNAR NERVE REPAIR Right      History reviewed. No pertinent family history.  Social History     Tobacco Use    Smoking status: Never    Smokeless tobacco: Never   Substance Use Topics    Alcohol use: Never    Drug use: Never     Review of Systems   Constitutional: Negative.    HENT: Negative.     Respiratory: Negative.     Cardiovascular: Negative.    Gastrointestinal: Negative.    Genitourinary: Negative.    Musculoskeletal: Negative.    Skin:  Positive for wound.   Neurological: Negative.    Psychiatric/Behavioral: Negative.     All other systems reviewed and are negative.    Physical Exam     Initial Vitals [10/10/22 1424]   BP Pulse Resp Temp SpO2   (!) 149/97 79 20 98.3 °F (36.8 °C) 96 %      MAP       --         Physical Exam    Nursing note and vitals reviewed.  Constitutional: Vital signs are normal. He appears well-developed and well-nourished.   HENT:   Head: Normocephalic and atraumatic.   Eyes: Conjunctivae and lids are normal.   Neck: Trachea normal. Neck supple.   Cardiovascular:  Normal rate, regular rhythm, normal heart sounds and normal pulses.           Pulmonary/Chest: Breath sounds normal. He has no wheezes. He has no rhonchi.   Abdominal: Abdomen is soft. Bowel sounds are normal. He exhibits no distension. There is no abdominal tenderness. There is no rebound and no guarding.    Musculoskeletal:         General: Normal range of motion.      Cervical back: Neck supple.      Comments: Patient has full range of finger D IP and PIP joint of index and and left middle finger     Neurological: He is alert and oriented to person, place, and time. He has normal strength. GCS eye subscore is 4. GCS verbal subscore is 5. GCS motor subscore is 6.   Skin: Capillary refill takes less than 2 seconds.   No present puncturing left middle finger between PIP and the IP joint and left index finger.  Neurovascularly intact.   Psychiatric: He has a normal mood and affect. His speech is normal. Thought content normal.       ED Course   Foreign Body    Date/Time: 10/10/2022 4:02 PM  Performed by: Vaibhav Abreu MD  Authorized by: Vaibhav Abreu MD   Body area: skin  General location: upper extremity  Location details: left hand  Anesthesia: digital block    Anesthesia:  Local Anesthetic: lidocaine 1% without epinephrine  Anesthetic total: 10 mL  Localization method: serial x-rays  Tendon involvement: none  Complexity: simple  1 objects recovered.  Objects recovered: nail  Post-procedure assessment: foreign body removed  Patient tolerance: Patient tolerated the procedure well with no immediate complications    Labs Reviewed - No data to display       Imaging Results              X-Ray Hand 1 View Left (In process)                      X-Ray Hand 3 view Left (Final result)  Result time 10/10/22 16:06:14      Final result by Susan Ohara MD (10/10/22 16:06:14)                   Impression:      Second digit foreign body.  Recommend follow-up radiographs following removal      Electronically signed by: Susan Ohara MD  Date:    10/10/2022  Time:    16:06               Narrative:    EXAMINATION:  XR HAND COMPLETE 3 VIEW LEFT    CLINICAL HISTORY:  Foreign body in to fingers;    FINDINGS:  Curved metal foreign body traversing mid aspect of 2nd digit.  No associated fracture evident.  Recommend follow-up  radiographs following removal                                       Medications   ceFAZolin injection 1 g (has no administration in time range)   HYDROmorphone (PF) injection 2 mg (2 mg Intravenous Given 10/10/22 1434)   promethazine (PHENERGAN) 25 mg in dextrose 5 % 50 mL IVPB (0 mg Intravenous Stopped 10/10/22 1453)     Medical Decision Making:   Initial Assessment:   No removed.  X-ray does not show any obvious fracture.  Tetanus is already updated.  Will give antibiotic.  Follow-up with hand surgery  Clinical Tests:   Radiological Study: Ordered and Reviewed           ED Course as of 10/10/22 1622   Mon Oct 10, 2022   1621 Repeat x-ray does not show any obvious fracture.  Will discharge patient with antibiotics.  First dose given here.  Strict return precautions given. [HD]      ED Course User Index  [HD] Vaibhav Abreu MD                 Clinical Impression:   Final diagnoses:  [M79.5] Foreign body (FB) in soft tissue (Primary)      ED Disposition Condition    Discharge Stable          ED Prescriptions       Medication Sig Dispense Start Date End Date Auth. Provider    cephALEXin (KEFLEX) 500 MG capsule Take 1 capsule (500 mg total) by mouth every 12 (twelve) hours. for 7 days 14 capsule 10/10/2022 10/17/2022 Vaibhav Abreu MD          Follow-up Information       Follow up With Specialties Details Why Contact Info    [facility] HAND SURGERY Hand Surgery In 2 days  [address] [id number] [phone]             Vaibhav Abreu MD  10/10/22 1622

## 2022-11-04 ENCOUNTER — PATIENT OUTREACH (OUTPATIENT)
Dept: EMERGENCY MEDICINE | Facility: HOSPITAL | Age: 37
End: 2022-11-04
Payer: MEDICAID

## 2022-11-04 NOTE — PROGRESS NOTES
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 Patient is trying to figure out how he can go about getting this fixed. Patient agrees to a call back with information.    ED navigator explained to patient she will make some calls in order to get the information for patient, and will give him a call back. ED navigator spoke with Mary with Financial Assistance with [facility] and was provided the number [phone] in order for patient to call and speak with her. ED navigator called patient back to give him this information, but he did not answer, so the information was left by VM. ED navigator will follow-up with patient on/around 11/8/2022 to ensure he received the information and was able to speak with someone, as well as to assist as needed.    Tiffanie Anderson  ED Navigator- [facility]  [phone]

## 2022-11-08 ENCOUNTER — PATIENT OUTREACH (OUTPATIENT)
Dept: EMERGENCY MEDICINE | Facility: HOSPITAL | Age: 37
End: 2022-11-08
Payer: MEDICAID

## 2022-11-22 NOTE — PROGRESS NOTES
Patient stated he has been out of town working, and has not been able to speak with Mary in regard to his billing issue; however, did receive the information. Patient denies any new needs or needing any additional assistance at this time.    ED Navigator ensured patient received the contact information to be assisted with his billing issues. ED navigator ensured patient had no other needs at this time. ED navigator will follow-up with patient on/around 1/3/2023.    Tiffanie Anderson  ED Navigator- [place]  [phone]

## 2023-01-11 ENCOUNTER — PATIENT OUTREACH (OUTPATIENT)
Dept: EMERGENCY MEDICINE | Facility: HOSPITAL | Age: 38
End: 2023-01-11
Payer: MEDICAID

## 2023-01-11 NOTE — PROGRESS NOTES
Patient stated he still never got the billing issue squared away and would like the contact information on whom to call again. Patient has no other needs.    ED navigator provided patient via e-mail Mary with Financial Assistance at [facility] number, which is [phone]. ED navigator ensured patient had no other needs at this time. ED navigator will follow-up with patient on/around 1/27/2023. ED navigator reminded patient to reach out if there is ever anything she can assist with.    Tiffanie Anderson  ED Navigator- [facility]  [phone]

## 2023-02-09 ENCOUNTER — PATIENT OUTREACH (OUTPATIENT)
Dept: EMERGENCY MEDICINE | Facility: HOSPITAL | Age: 38
End: 2023-02-09
Payer: MEDICAID

## 2023-02-09 NOTE — PROGRESS NOTES
Pt is unreachable. A VM was left. This encounter will be closed and pt should be contacted if/when he comes back to the ER again for a F/U.    Tiffanie Anderson  ED Navigator- [place]  [phone]

## 2023-06-09 ENCOUNTER — HOSPITAL ENCOUNTER (EMERGENCY)
Facility: HOSPITAL | Age: 38
Discharge: HOME | End: 2023-06-09
Attending: EMERGENCY MEDICINE
Payer: MEDICAID

## 2023-06-09 VITALS
HEART RATE: 87 BPM | DIASTOLIC BLOOD PRESSURE: 92 MMHG | TEMPERATURE: 99 F | BODY MASS INDEX: 28.06 KG/M2 | WEIGHT: 196 LBS | RESPIRATION RATE: 17 BRPM | SYSTOLIC BLOOD PRESSURE: 143 MMHG | HEIGHT: 70 IN | OXYGEN SATURATION: 98 %

## 2023-06-09 DIAGNOSIS — H10.31 ACUTE BACTERIAL CONJUNCTIVITIS OF RIGHT EYE: ICD-10-CM

## 2023-06-09 DIAGNOSIS — B96.89 BACTERIAL SINUSITIS: Primary | ICD-10-CM

## 2023-06-09 DIAGNOSIS — J32.9 BACTERIAL SINUSITIS: Primary | ICD-10-CM

## 2023-06-09 PROCEDURE — 99284 EMERGENCY DEPT VISIT MOD MDM: CPT

## 2023-06-09 RX ORDER — PROMETHAZINE HYDROCHLORIDE AND DEXTROMETHORPHAN HYDROBROMIDE 6.25; 15 MG/5ML; MG/5ML
5 SYRUP ORAL EVERY 4 HOURS PRN
Qty: 118 ML | Refills: 0 | Status: SHIPPED | OUTPATIENT
Start: 2023-06-09 | End: 2023-06-19

## 2023-06-09 RX ORDER — ERYTHROMYCIN 5 MG/G
OINTMENT OPHTHALMIC EVERY 6 HOURS
Qty: 3.5 G | Refills: 0 | Status: SHIPPED | OUTPATIENT
Start: 2023-06-09 | End: 2023-06-19

## 2023-06-09 RX ORDER — AMOXICILLIN 875 MG/1
875 TABLET, FILM COATED ORAL 2 TIMES DAILY
Qty: 20 TABLET | Refills: 0 | Status: SHIPPED | OUTPATIENT
Start: 2023-06-09 | End: 2023-06-19

## 2023-06-09 NOTE — ED PROVIDER NOTES
Encounter Date: 6/9/2023       History     Chief Complaint   Patient presents with    Eye Problem     drainage, redness to right eye. Onset 4 days.     This is a 37-year-old white male with noncontributory past medical history who presents to the emergency department with multiple complaints.  He reports intermittent redness and thick drainage from the right eye over the last month after exposure to children with similar symptoms.  He also reports that for the last 4 weeks he has been experiencing stuffy/runny nose with productive cough with yellow sputum.  He denies fever, vomiting, or diarrhea.  He denies vision changes or known injury/trauma to the eye.    Review of patient's allergies indicates:  No Known Allergies  Past Medical History:   Diagnosis Date    Dental abscess     Hiatal hernia      Past Surgical History:   Procedure Laterality Date    ULNAR NERVE REPAIR Right      No family history on file.  Social History     Tobacco Use    Smoking status: Never    Smokeless tobacco: Never   Substance Use Topics    Alcohol use: Never    Drug use: Never     Review of Systems   Constitutional:  Negative for appetite change and fever.   HENT:  Positive for congestion, rhinorrhea, sinus pressure and sinus pain. Negative for sore throat.    Respiratory:  Positive for cough.    Cardiovascular: Negative.    Gastrointestinal:  Negative for diarrhea.     Physical Exam     Initial Vitals [06/09/23 1701]   BP Pulse Resp Temp SpO2   (!) 143/92 87 17 98.5 °F (36.9 °C) 98 %      MAP       --         Physical Exam    Nursing note and vitals reviewed.  Constitutional: He appears well-developed and well-nourished. He is active. No distress.   HENT:   Head: Normocephalic and atraumatic.   Eyes: EOM are normal. Pupils are equal, round, and reactive to light. Right eye exhibits discharge and exudate. Right conjunctiva is injected.   Slit lamp exam:       The right eye shows no hyphema and no hypopyon.   Neck: Neck supple.   Normal  range of motion.  Cardiovascular:  Normal rate, regular rhythm and normal heart sounds.           Pulmonary/Chest: Breath sounds normal. No respiratory distress.   Musculoskeletal:      Cervical back: Normal range of motion and neck supple.     Neurological: He is alert and oriented to person, place, and time. GCS eye subscore is 4. GCS verbal subscore is 5. GCS motor subscore is 6.   Skin: Skin is warm and dry. Capillary refill takes less than 2 seconds.   Psychiatric: He has a normal mood and affect. His behavior is normal. Thought content normal.       ED Course   Procedures  Labs Reviewed - No data to display       Imaging Results    None          Medications - No data to display                           Clinical Impression:   Final diagnoses:  [J32.9, B96.89] Bacterial sinusitis (Primary)  [H10.31] Acute bacterial conjunctivitis of right eye        ED Disposition Condition    Discharge Stable          ED Prescriptions       Medication Sig Dispense Start Date End Date Auth. Provider    erythromycin (ROMYCIN) ophthalmic ointment Place into both eyes every 6 (six) hours. for 10 days 3.5 g 6/9/2023 6/19/2023 Anastasia Hooks NP    amoxicillin (AMOXIL) 875 MG tablet Take 1 tablet (875 mg total) by mouth 2 (two) times daily. for 10 days 20 tablet 6/9/2023 6/19/2023 Anastasia Hooks NP    promethazine-dextromethorphan (PROMETHAZINE-DM) 6.25-15 mg/5 mL Syrp Take 5 mLs by mouth every 4 (four) hours as needed. 118 mL 6/9/2023 6/19/2023 Anastasia Hooks NP          Follow-up Information       Follow up With Specialties Details Why Contact Info    Isabel Kruse MD Internal Medicine Schedule an appointment as soon as possible for a visit in 2 days for re-evaluation of today's complaint [address] [address]  [phone]               Anastasia Hooks NP  06/09/23 3071

## 2023-06-09 NOTE — DISCHARGE INSTRUCTIONS
Be sure to finish all antibiotics.  Apply eye drops as directed.  Wipe eye drainage from inner corner to outer corner.  Doctor in 1-2 days.  Return to the emergency room for worsening condition.

## 2023-12-01 ENCOUNTER — HOSPITAL ENCOUNTER (EMERGENCY)
Facility: HOSPITAL | Age: 38
Discharge: HOME | End: 2023-12-01
Attending: EMERGENCY MEDICINE
Payer: MEDICAID

## 2023-12-01 VITALS
HEART RATE: 83 BPM | SYSTOLIC BLOOD PRESSURE: 151 MMHG | OXYGEN SATURATION: 98 % | HEIGHT: 70 IN | DIASTOLIC BLOOD PRESSURE: 85 MMHG | WEIGHT: 195 LBS | RESPIRATION RATE: 18 BRPM | BODY MASS INDEX: 27.92 KG/M2 | TEMPERATURE: 98 F

## 2023-12-01 DIAGNOSIS — S61.217A LACERATION OF LEFT LITTLE FINGER WITHOUT FOREIGN BODY WITHOUT DAMAGE TO NAIL, INITIAL ENCOUNTER: Primary | ICD-10-CM

## 2023-12-01 DIAGNOSIS — S62.639B OPEN FRACTURE OF TUFT OF DISTAL PHALANX OF FINGER: ICD-10-CM

## 2023-12-01 PROCEDURE — 90471 IMMUNIZATION ADMIN: CPT | Performed by: EMERGENCY MEDICINE

## 2023-12-01 PROCEDURE — 90715 TDAP VACCINE 7 YRS/> IM: CPT | Performed by: EMERGENCY MEDICINE

## 2023-12-01 PROCEDURE — 63600175 PHARM REV CODE 636 W HCPCS: Performed by: EMERGENCY MEDICINE

## 2023-12-01 PROCEDURE — 25000003 PHARM REV CODE 250: Performed by: EMERGENCY MEDICINE

## 2023-12-01 PROCEDURE — 99284 EMERGENCY DEPT VISIT MOD MDM: CPT

## 2023-12-01 RX ORDER — MUPIROCIN 20 MG/G
1 OINTMENT TOPICAL
Status: COMPLETED | OUTPATIENT
Start: 2023-12-01 | End: 2023-12-01

## 2023-12-01 RX ORDER — CIPROFLOXACIN 500 MG/1
500 TABLET ORAL 2 TIMES DAILY
Qty: 20 TABLET | Refills: 0 | Status: SHIPPED | OUTPATIENT
Start: 2023-12-01 | End: 2023-12-11

## 2023-12-01 RX ORDER — OXYCODONE AND ACETAMINOPHEN 10; 325 MG/1; MG/1
1 TABLET ORAL ONCE
Status: COMPLETED | OUTPATIENT
Start: 2023-12-01 | End: 2023-12-01

## 2023-12-01 RX ORDER — OXYCODONE AND ACETAMINOPHEN 7.5; 325 MG/1; MG/1
1 TABLET ORAL EVERY 8 HOURS PRN
Qty: 21 TABLET | Refills: 0 | Status: SHIPPED | OUTPATIENT
Start: 2023-12-01

## 2023-12-01 RX ORDER — CIPROFLOXACIN 500 MG/1
500 TABLET ORAL
Status: COMPLETED | OUTPATIENT
Start: 2023-12-01 | End: 2023-12-01

## 2023-12-01 RX ADMIN — MUPIROCIN 1 TUBE: 20 OINTMENT TOPICAL at 01:12

## 2023-12-01 RX ADMIN — TETANUS TOXOID, REDUCED DIPHTHERIA TOXOID AND ACELLULAR PERTUSSIS VACCINE, ADSORBED 0.5 ML: 5; 2.5; 8; 8; 2.5 SUSPENSION INTRAMUSCULAR at 01:12

## 2023-12-01 RX ADMIN — CIPROFLOXACIN HYDROCHLORIDE 500 MG: 500 TABLET, FILM COATED ORAL at 01:12

## 2023-12-01 RX ADMIN — OXYCODONE HYDROCHLORIDE AND ACETAMINOPHEN 1 TABLET: 10; 325 TABLET ORAL at 01:12

## 2023-12-01 NOTE — ED PROVIDER NOTES
Encounter Date: 12/1/2023       History     Chief Complaint   Patient presents with    Finger Injury     Pt stated that left 5th finger was struck by handheld router. Presents to ED with injury to 4th & 5th left fingers. Bleeding controlled PTA.      38-year-old male injured his left 5th digit after a struck by a router.  He is right-hand dominant.  No other injury.  Superficial lacerations noted tip of his 5th digit of his left hand.  Bleeding controlled.      Review of patient's allergies indicates:  No Known Allergies  Past Medical History:   Diagnosis Date    Dental abscess     Hiatal hernia      Past Surgical History:   Procedure Laterality Date    ULNAR NERVE REPAIR Right      No family history on file.  Social History     Tobacco Use    Smoking status: Never    Smokeless tobacco: Never   Substance Use Topics    Alcohol use: Never    Drug use: Never     Review of Systems   Constitutional:  Negative for fever.   HENT:  Negative for sore throat.    Respiratory:  Negative for shortness of breath.    Cardiovascular:  Negative for chest pain.   Gastrointestinal:  Negative for nausea.   Genitourinary:  Negative for dysuria.   Musculoskeletal:  Negative for back pain.   Skin:  Positive for wound. Negative for rash.   Neurological:  Negative for weakness.   Hematological:  Does not bruise/bleed easily.   All other systems reviewed and are negative.      Physical Exam     Initial Vitals [12/01/23 1251]   BP Pulse Resp Temp SpO2   (!) 151/85 83 18 98.4 °F (36.9 °C) 98 %      MAP       --         Physical Exam    Nursing note and vitals reviewed.  Constitutional: He appears well-developed and well-nourished. He is not diaphoretic. No distress.   HENT:   Head: Normocephalic and atraumatic.   Eyes: Conjunctivae and EOM are normal. Pupils are equal, round, and reactive to light. Right eye exhibits no discharge. Left eye exhibits no discharge. No scleral icterus.   Neck: Neck supple. No JVD present.   Normal range of  motion.  Cardiovascular:  Normal rate, regular rhythm, normal heart sounds and intact distal pulses.           No murmur heard.  Pulmonary/Chest: Breath sounds normal. No stridor. No respiratory distress. He has no wheezes. He has no rhonchi. He has no rales. He exhibits no tenderness.   Abdominal: Abdomen is soft. Bowel sounds are normal. He exhibits no distension and no mass. There is no abdominal tenderness. There is no rebound and no guarding.   Musculoskeletal:         General: No tenderness or edema. Normal range of motion.      Cervical back: Normal range of motion and neck supple.     Neurological: He is alert and oriented to person, place, and time. He has normal strength. GCS score is 15. GCS eye subscore is 4. GCS verbal subscore is 5. GCS motor subscore is 6.   Skin: Skin is warm and dry. Capillary refill takes less than 2 seconds.   Superficial lacerations noted to 5th digit left hand, no nail involvement, neurovascularly intact         ED Course   Procedures  Labs Reviewed - No data to display       Imaging Results              X-Ray Hand 3 view Left (In process)                      Medications   ciprofloxacin HCl tablet 500 mg (has no administration in time range)   oxyCODONE-acetaminophen  mg per tablet 1 tablet (has no administration in time range)   mupirocin 2 % ointment 1 Tube (has no administration in time range)   Tdap (BOOSTRIX) vaccine injection 0.5 mL (0.5 mLs Intramuscular Given 12/1/23 1313)     Medical Decision Making  Amount and/or Complexity of Data Reviewed  Radiology: ordered.    Risk  Prescription drug management.               ED Course as of 12/01/23 1322   Fri Dec 01, 2023   1313 Tuft fracture distal 5th digit left [SD]      ED Course User Index  [SD] Bob Suarez MD               Medical Decision Making:   Differential Diagnosis:   Left 5th digit laceration  ED Management:  Tuft fracture noted to the distal tip of his left 5th digit, lacerations for not be able to be  closed with suture, but will place antibiotic ointment and do a good pressure dressing, will heal up nicely.  Antibiotics and pain control.  Follow up to primary care physician             Clinical Impression:  Final diagnoses:  [S61217A] Laceration of left little finger without foreign body without damage to nail, initial encounter (Primary)  [H23.161O] Open fracture of tuft of distal phalanx of finger          ED Disposition Condition    Discharge Stable          ED Prescriptions       Medication Sig Dispense Start Date End Date Auth. Provider    ciprofloxacin HCl (CIPRO) 500 MG tablet Take 1 tablet (500 mg total) by mouth 2 (two) times daily. for 10 days 20 tablet 12/1/2023 12/11/2023 Bob Suarez MD    oxyCODONE-acetaminophen (PERCOCET) 7.5-325 mg per tablet Take 1 tablet by mouth every 8 (eight) hours as needed for Pain. 21 tablet 12/1/2023 -- Bob Suarez MD          Follow-up Information       Follow up With Specialties Details Why Contact Info Additional Information    Primary care physician  In 2 days       [facility] Emergency Medicine  As needed [address] [facility] [id number] [phone] Floor 1    [facility] Orthopedics Orthopedics In 2 days  [address] [facility], [address] [place] [id number]  [phone] [address]             Bob Suarez MD  12/01/23 1379

## 2024-08-06 ENCOUNTER — HOSPITAL ENCOUNTER (OUTPATIENT)
Dept: RADIOLOGY | Facility: HOSPITAL | Age: 39
Discharge: HOME | End: 2024-08-06
Attending: NURSE PRACTITIONER
Payer: MEDICAID

## 2024-08-06 ENCOUNTER — OFFICE VISIT (OUTPATIENT)
Dept: ORTHOPEDICS | Facility: CLINIC | Age: 39
End: 2024-08-06
Payer: MEDICAID

## 2024-08-06 ENCOUNTER — TELEPHONE (OUTPATIENT)
Dept: ORTHOPEDICS | Facility: CLINIC | Age: 39
End: 2024-08-06
Payer: MEDICAID

## 2024-08-06 DIAGNOSIS — M25.521 RIGHT ELBOW PAIN: ICD-10-CM

## 2024-08-06 DIAGNOSIS — S59.901A INJURY OF RIGHT ELBOW, INITIAL ENCOUNTER: Primary | ICD-10-CM

## 2024-08-06 DIAGNOSIS — M25.521 RIGHT ELBOW PAIN: Primary | ICD-10-CM

## 2024-08-06 PROCEDURE — 1160F RVW MEDS BY RX/DR IN RCRD: CPT | Mod: CPTII,,, | Performed by: NURSE PRACTITIONER

## 2024-08-06 PROCEDURE — 99999 PR PBB SHADOW E&M-EST. PATIENT-LVL I: CPT | Mod: PBBFAC,,, | Performed by: NURSE PRACTITIONER

## 2024-08-06 PROCEDURE — 73080 X-RAY EXAM OF ELBOW: CPT | Mod: TC,RT

## 2024-08-06 PROCEDURE — 99211 OFF/OP EST MAY X REQ PHY/QHP: CPT | Mod: PBBFAC,25 | Performed by: NURSE PRACTITIONER

## 2024-08-06 PROCEDURE — 1159F MED LIST DOCD IN RCRD: CPT | Mod: CPTII,,, | Performed by: NURSE PRACTITIONER

## 2024-08-06 PROCEDURE — 99213 OFFICE O/P EST LOW 20 MIN: CPT | Mod: S$PBB,,, | Performed by: NURSE PRACTITIONER

## 2024-08-13 ENCOUNTER — HOSPITAL ENCOUNTER (OUTPATIENT)
Dept: RADIOLOGY | Facility: HOSPITAL | Age: 39
Discharge: HOME | End: 2024-08-13
Attending: NURSE PRACTITIONER
Payer: MEDICAID

## 2024-08-13 DIAGNOSIS — S59.901A INJURY OF RIGHT ELBOW, INITIAL ENCOUNTER: ICD-10-CM

## 2024-08-13 DIAGNOSIS — M25.521 RIGHT ELBOW PAIN: ICD-10-CM

## 2024-08-13 DIAGNOSIS — M25.521 RIGHT ELBOW PAIN: Primary | ICD-10-CM

## 2024-08-13 PROCEDURE — 73221 MRI JOINT UPR EXTREM W/O DYE: CPT | Mod: TC,RT

## 2024-08-13 RX ORDER — HYDROCODONE BITARTRATE AND ACETAMINOPHEN 7.5; 325 MG/1; MG/1
1 TABLET ORAL EVERY 6 HOURS PRN
Qty: 15 TABLET | Refills: 0 | Status: SHIPPED | OUTPATIENT
Start: 2024-08-13 | End: 2024-08-15 | Stop reason: RX

## 2024-08-15 ENCOUNTER — OFFICE VISIT (OUTPATIENT)
Dept: ORTHOPEDICS | Facility: CLINIC | Age: 39
End: 2024-08-15
Payer: MEDICAID

## 2024-08-15 DIAGNOSIS — S46.211D BICEPS STRAIN, RIGHT, SUBSEQUENT ENCOUNTER: ICD-10-CM

## 2024-08-15 DIAGNOSIS — M25.521 RIGHT ELBOW PAIN: Primary | ICD-10-CM

## 2024-08-15 PROCEDURE — 99214 OFFICE O/P EST MOD 30 MIN: CPT | Mod: S$PBB,,, | Performed by: NURSE PRACTITIONER

## 2024-08-15 PROCEDURE — 99999 PR PBB SHADOW E&M-EST. PATIENT-LVL III: CPT | Mod: PBBFAC,,, | Performed by: NURSE PRACTITIONER

## 2024-08-15 PROCEDURE — 1159F MED LIST DOCD IN RCRD: CPT | Mod: CPTII,,, | Performed by: NURSE PRACTITIONER

## 2024-08-15 PROCEDURE — 99213 OFFICE O/P EST LOW 20 MIN: CPT | Mod: PBBFAC | Performed by: NURSE PRACTITIONER

## 2024-08-15 RX ORDER — HYDROCODONE BITARTRATE AND ACETAMINOPHEN 7.5; 325 MG/1; MG/1
1 TABLET ORAL EVERY 6 HOURS PRN
Qty: 15 TABLET | Refills: 0 | Status: SHIPPED | OUTPATIENT
Start: 2024-08-15

## 2024-08-15 RX ORDER — SULINDAC 200 MG/1
TABLET ORAL
COMMUNITY
Start: 2024-08-02 | End: 2024-08-15 | Stop reason: CLARIF

## 2024-08-15 RX ORDER — IBUPROFEN 800 MG/1
800 TABLET ORAL EVERY 8 HOURS PRN
Qty: 30 TABLET | Refills: 0 | Status: SHIPPED | OUTPATIENT
Start: 2024-08-15

## 2024-08-15 NOTE — PROGRESS NOTES
Subjective:      Follow up: MRI review right elbow:     Syed Cm is an 39 y.o. male who presents for follow up for an injury to his right elbow. DOI was on 08/02/24. He is here for MRI review of the elbow. He presents and rates his pain as a 8/10. The pain is in the distal biceps and elbow area as previous. He reports decreased swelling and states that his ROM has improved some but still has difficulty fully extending and rotation. He states that the elbow feels weak as previous. The pain worsens with movement.      The following portions of the patient's history were reviewed and updated as appropriate: allergies, current medications, past family history, past medical history, past social history, past surgical history and problem list.     Medical History:       Past Medical History:   Diagnosis Date    Dental abscess      Hiatal hernia           Surgical History:        Past Surgical History:   Procedure Laterality Date    ULNAR NERVE REPAIR Right           Family History:  No family history on file.     Allergies:   Review of patient's allergies indicates:  No Known Allergies        Review of Systems  Constitutional: negative  Musculoskeletal:positive for arthralgias  Neurological: negative  Behavioral/Psych: negative      Objective:         Right elbow:  Inspection reveals mild swelling RT elbow.  Soft tissue tenderness to palpation in area of the distal biceps tendon attachment as previous. Tenderness over the medial condyle.  Range of motion of the elbow was decreased, he is able to extend the elbow better than previous, still lacks supination.   Elbow laxity - none appreciated.    Left elbow:  normal exam, no swelling, tenderness, instability; ligaments intact, full ROM both hands, wrists, and finger joints      Imaging:  X-ray: RT elbow was reviewed from 08/06/24  Suggestion of mild degenerative change without evidence for acute radiographic abnormality.     RT elbow MRI reviewed by me today:    Impression:     1. The biceps tendon is able to be visualized with no retracted tear detected. There are questionable signal abnormalities at its distal aspect near the radial tuberosity which may indicate tendinosis and/or partial thickness tear.  2. Question of presence of a subcentimeter loose body at the olecranon fossa region.  3. Mild osteoarthritic change        Assessment:      RT elbow injury, biceps strain vs partial tear     Plan:      1. MRI RT Elbow was reviewed, consistent with a distal biceps strain vs partial thickness tear.  2. Will make referral to orthopedic surgery to evaluate partial biceps tear.   3. Ice and elevate as directed. Limit lifting as directed.   4. Referral to OT to start PROM and prevent elbow stiffness.   5. Rx- Hydrocodone 7.5/325mg prn pain. Ibuprofen as directed.   6. RTC 2 weeks for motion check if surgical referral still pending.   7. Questions answered.

## 2024-08-30 ENCOUNTER — OFFICE VISIT (OUTPATIENT)
Dept: ORTHOPEDICS | Facility: CLINIC | Age: 39
End: 2024-08-30
Payer: MEDICAID

## 2024-08-30 DIAGNOSIS — M25.521 RIGHT ELBOW PAIN: Primary | ICD-10-CM

## 2024-08-30 DIAGNOSIS — S46.211D BICEPS STRAIN, RIGHT, SUBSEQUENT ENCOUNTER: ICD-10-CM

## 2024-08-30 PROCEDURE — 99999 PR PBB SHADOW E&M-EST. PATIENT-LVL II: CPT | Mod: PBBFAC,,, | Performed by: NURSE PRACTITIONER

## 2024-08-30 PROCEDURE — 99213 OFFICE O/P EST LOW 20 MIN: CPT | Mod: S$PBB,,, | Performed by: NURSE PRACTITIONER

## 2024-08-30 PROCEDURE — 1160F RVW MEDS BY RX/DR IN RCRD: CPT | Mod: CPTII,,, | Performed by: NURSE PRACTITIONER

## 2024-08-30 PROCEDURE — 99212 OFFICE O/P EST SF 10 MIN: CPT | Mod: PBBFAC | Performed by: NURSE PRACTITIONER

## 2024-08-30 PROCEDURE — 1159F MED LIST DOCD IN RCRD: CPT | Mod: CPTII,,, | Performed by: NURSE PRACTITIONER

## 2024-08-30 RX ORDER — HYDROCODONE BITARTRATE AND ACETAMINOPHEN 5; 325 MG/1; MG/1
1 TABLET ORAL EVERY 6 HOURS PRN
Qty: 15 TABLET | Refills: 0 | Status: SHIPPED | OUTPATIENT
Start: 2024-08-30

## 2024-08-30 RX ORDER — IBUPROFEN 800 MG/1
800 TABLET ORAL EVERY 8 HOURS PRN
Qty: 30 TABLET | Refills: 0 | Status: SHIPPED | OUTPATIENT
Start: 2024-08-30

## 2024-08-30 NOTE — PROGRESS NOTES
Subjective:      Follow up: Right elbow:      Syed Cm is an 39 y.o. male who presents for follow up for an injury to his right elbow. DOI was on 08/02/24. Previous MRI review of the elbow showed questionable distal biceps partial thickness tea . He presents and states that his appointment with [facility] orthopedics is in 3 months. He presents and states that he continues with elbow pain and decreased ROM. He rates his pain as a 6/10. The pain is in the distal biceps and elbow area as previous. He reports resolving swelling and states that his ROM again has improved but as before he has decreased rotation. The pain worsens with movement.      The following portions of the patient's history were reviewed and updated as appropriate: allergies, current medications, past family history, past medical history, past social history, past surgical history and problem list.     Medical History:          Past Medical History:   Diagnosis Date    Dental abscess      Hiatal hernia           Surgical History:            Past Surgical History:   Procedure Laterality Date    ULNAR NERVE REPAIR Right           Family History:  No family history on file.     Allergies:   Review of patient's allergies indicates:  No Known Allergies        Review of Systems  Constitutional: negative  Musculoskeletal:positive for arthralgias  Neurological: negative  Behavioral/Psych: negative      Objective:         Right elbow:  Inspection reveals resolving swelling RT elbow.  Soft tissue tenderness to palpation in area of the distal biceps tendon as previous.   Range of motion of the elbow was improved but still lacks supination.   Elbow laxity - none appreciated.    Left elbow:  normal exam, no swelling, tenderness, instability; ligaments intact, full ROM both hands, wrists, and finger joints      Imaging:  X-ray: RT elbow was reviewed from 08/06/24  Suggestion of mild degenerative change without evidence for acute radiographic abnormality.       RT elbow MRI reviewed from 08/13/24     1. The biceps tendon is able to be visualized with no retracted tear detected. There are questionable signal abnormalities at its distal aspect near the radial tuberosity which may indicate tendinosis and/or partial thickness tear.  2. Question of presence of a subcentimeter loose body at the olecranon fossa region.  3. Mild osteoarthritic change        Assessment:      RT elbow injury, biceps strain vs partial tear     Plan:      1. He is noted with minimal improvement. He continues with pain and difficulty with supination.   2. Referral to orthopedic surgery to evaluate partial biceps tear.   3. Ice and elevate as directed. Limit lifting as directed.   4. Elbow ROM and OT pending.   5. Rx- Hydrocodone 5/325mg prn breakthrough pain as directed. Ibuprofen prn as directed.   6. RTC as directed.   7. Questions answered.

## 2024-09-03 ENCOUNTER — TELEPHONE (OUTPATIENT)
Dept: ORTHOPEDICS | Facility: CLINIC | Age: 39
End: 2024-09-03
Payer: MEDICAID

## 2024-09-03 NOTE — TELEPHONE ENCOUNTER
----- Message from Eric Cosme NP sent at 9/3/2024  7:21 AM CDT -----  Regarding: referral  RT elbow injury  MRI- suspected partial distal biceps tear  Needs surgical evaluation with Dr Billy

## 2024-09-05 ENCOUNTER — OFFICE VISIT (OUTPATIENT)
Dept: ORTHOPEDICS | Facility: CLINIC | Age: 39
End: 2024-09-05
Payer: MEDICAID

## 2024-09-05 VITALS — HEIGHT: 70 IN | WEIGHT: 199.94 LBS | BODY MASS INDEX: 28.62 KG/M2

## 2024-09-05 DIAGNOSIS — M24.021 LOOSE BODY IN RIGHT ELBOW: ICD-10-CM

## 2024-09-05 DIAGNOSIS — G56.21 ULNAR NEURITIS, RIGHT: ICD-10-CM

## 2024-09-05 DIAGNOSIS — S46.211A RUPTURE OF RIGHT BICEPS TENDON, INITIAL ENCOUNTER: ICD-10-CM

## 2024-09-05 DIAGNOSIS — M19.021 OSTEOARTHROSIS OF RIGHT ELBOW: Primary | ICD-10-CM

## 2024-09-05 PROCEDURE — 99999 PR PBB SHADOW E&M-EST. PATIENT-LVL III: CPT | Mod: PBBFAC,,, | Performed by: ORTHOPAEDIC SURGERY

## 2024-09-05 PROCEDURE — 99213 OFFICE O/P EST LOW 20 MIN: CPT | Mod: PBBFAC,PN | Performed by: ORTHOPAEDIC SURGERY

## 2024-09-05 RX ORDER — DICLOFENAC SODIUM 10 MG/G
2 GEL TOPICAL 3 TIMES DAILY
Qty: 100 G | Refills: 2 | Status: SHIPPED | OUTPATIENT
Start: 2024-09-05

## 2024-09-05 NOTE — PROGRESS NOTES
[facility], Orthopedics and Sports Medicine  [facility]    New Patient Office Visit  09/05/2024     Subjective:      Syed Cm is a 39 y.o. male referred by Eric Cosme for evaluation and treatment of right elbow pain..  This is evaluated as a personal injury.   The patient has the following symptoms: pain located right elbow, popping sensation, and stiffness.  The symptoms began 3 weeks ago with an injury while lifting heavy items at work.  The pain is localized to posterior elbow and olecranon fossa as well as antecubital fossa.  Feels something  is stuck in back of elbow.  Feels crepitation in the elbow.     History of right ulnar nerve decompression approx 10 years ago.  Currently has numbness tingling in the ulnar 2 digits right hand.     Works as .      Outside reports reviewed: historical medical records, office notes, and radiology reports.    Past Medical History:   Diagnosis Date    Dental abscess     Hiatal hernia        There is no problem list on file for this patient.      Past Surgical History:   Procedure Laterality Date    ULNAR NERVE REPAIR Right         Current Outpatient Medications   Medication Instructions    clindamycin (CLEOCIN) 300 mg, Oral, Every 6 hours    diclofenac sodium (VOLTAREN) 2 g, Topical (Top), 3 times daily    HYDROcodone-acetaminophen (NORCO) 5-325 mg per tablet 1 tablet, Oral, Every 6 hours PRN    ibuprofen (ADVIL,MOTRIN) 800 mg, Oral, Every 8 hours PRN        Review of patient's allergies indicates:  No Known Allergies    Social History     Socioeconomic History    Marital status: Single   Tobacco Use    Smoking status: Never    Smokeless tobacco: Never   Substance and Sexual Activity    Alcohol use: Never    Drug use: Never    Sexual activity: Not Currently     Social Determinants of Health     Financial Resource Strain: Low Risk  (7/13/2022)    Overall Financial Resource Strain (CARDIA)     Difficulty of Paying  Living Expenses: Not very hard   Food Insecurity: No Food Insecurity (7/13/2022)    Hunger Vital Sign     Worried About Running Out of Food in the Last Year: Never true     Ran Out of Food in the Last Year: Never true   Transportation Needs: No Transportation Needs (7/13/2022)    PRAPARE - Transportation     Lack of Transportation (Medical): No     Lack of Transportation (Non-Medical): No   Physical Activity: Sufficiently Active (7/13/2022)    Exercise Vital Sign     Days of Exercise per Week: 5 days     Minutes of Exercise per Session: 150+ min   Stress: Stress Concern Present (7/13/2022)    Citizen of [place] of Occupational Health - Occupational Stress Questionnaire     Feeling of Stress : Very much   Housing Stability: Low Risk  (7/13/2022)    Housing Stability Vital Sign     Unable to Pay for Housing in the Last Year: No     Number of Places Lived in the Last Year: 1     Unstable Housing in the Last Year: No       No family history on file.      Review of Systems   Constitutional: Negative for chills and fever.   HENT:  Negative for hearing loss.    Eyes:  Negative for blurred vision.   Cardiovascular:  Negative for chest pain.   Respiratory:  Negative for shortness of breath.    Gastrointestinal:  Negative for abdominal pain.   Neurological:  Negative for light-headedness.          Objective:      General    Nursing note and vitals reviewed.  Constitutional: He is oriented to person, place, and time. He appears well-developed and well-nourished.   HENT:   Head: Normocephalic and atraumatic.   Eyes: Pupils are equal, round, and reactive to light.   Cardiovascular:  Normal rate and regular rhythm.            Pulmonary/Chest: Effort normal.   Abdominal: Soft.   Neurological: He is oriented to person, place, and time.   Psychiatric: He has a normal mood and affect. His behavior is normal.             Right Hand/Wrist Exam     Other     Neuorologic Exam    Median Distribution: normal  Ulnar Distribution:  abnormal  Radial Distribution: normal      Right Elbow Exam     Inspection   Scars: absent  Effusion: absent  Bruising: absent  Deformity: absent    Pain   The patient exhibits pain of the olecranon    Tenderness   The patient is tender to palpation of the olecranon fossa.     Range of Motion   Extension:  10   Flexion:  120   Pronation:  normal   Supination:  normal     Other   Sensation: normal    Comments:  Tender to distal biceps tendon    Normal hook test otherwise           Muscle Strength   Right Upper Extremity   Elbow Pronation:  5/5   Elbow Supination:  5/5   Elbow Extension: 5/5  Elbow Flexion: 5/5    Vascular Exam     Right Pulses      Radial:                    2+      Capillary Refill  Right Hand: normal capillary refill  Right Bernardo's Test: no rapid refill no slow refill        Edema  Right Forearm: absent      Imaging:  Radiographs of the right elbow taken  8/6/2024  were personally reviewed from the [facility] EMR.  Multiple views of the elbow are available today for review, including an AP, lateral, and oblique view.  The ulnohumeral joint demonstrates moderate degenerative changes.  The radiocapitellar joint demonstrates moderate degenerative changes.  There is no acute fracture or dislocation.  There is loose body or foreign objects noted.     8/13/2024 Routine   MRI personally reviewed.      Narrative & Impression  EXAMINATION:  MRI ELBOW WITHOUT CONTRAST RIGHT     CLINICAL HISTORY:  Unspecified injury of right elbow, initial encounter Elbow trauma, neurovasc/lig/tendon injury suspected; right elbow pain with swelling and limited range of motion for 10 days after lifting injury     COMPARISON:  Right elbow radiographs 08/06/2024     TECHNIQUE:  Multiplanar multisequence MR imaging performed of the right elbow without contrast     FINDINGS:  Artifact on this exam results in markedly decreased resolution.  The distal most aspect of the biceps tendon is overall suboptimally evaluated secondary to  this artifact.  The tendon is able to be visualized and does not appear to be completely ruptured.  On the proton density sequences there is question of peritendinous edema at the distal aspect of the tendon near the radial tuberosity.  In addition, the tendon is heterogeneous in signal in this region.  These findings may indicate tendinosis and/or partial tear of the tendon with no retracted tear identified within the confines of this exam.     Triceps tendon is grossly intact.  No definitive marrow signal change to suggest acute macro fracture or trabecular microfracture within the confines of this exam.  There is multifocal marrow inhomogeneity secondary to incomplete fat saturation.  The radial head and proximal ulna align appropriately with the humerus.  Mild osteophytosis of the radial head.     Cannot adequately assess the radial and ulnar collateral ligaments on this exam secondary to degree of artifact.  No evidence of an elbow joint effusion.  Oval-shaped structure measuring 9 x 4.5 mm at the olecranon fossa near the ulna may indicate a loose body within the joint.     Impression:     1. Markedly suboptimal exam secondary to artifact resulting in decreased resolution. The biceps tendon is able to be visualized with no retracted tear detected. There are questionable signal abnormalities at its distal aspect near the radial tuberosity which may indicate tendinosis and/or partial thickness tear.  2. Question of presence of a subcentimeter loose body at the olecranon fossa region.  3. Mild osteoarthritic change     Procedures        Assessment:       Syed Cm is a 39 y.o. male seen in the office today. The primary encounter diagnosis was Osteoarthrosis of right elbow. Diagnoses of Loose body in right elbow, Rupture of right biceps tendon, initial encounter, and Ulnar neuritis, right were also pertinent to this visit.  Non-operative treatment is recommended at this time. Will trial physical therapy.   Discussed surgical removal loose body with chondroplasty if indicated and symptoms continue.  Likely has partial distal biceps tear and will trial nonsurgical treatment, consider surgical treatment if not improved. History ulnar nerve decompression now with ulnar neuritis.  Recommended topical nsaids and can consider diagnostic testing if symptoms worsen.  The natural history and expected course discussed with patient. Various treatment options were discussed, including their risks and benefits. All of the patient's questions were answered.     Plan:      Physical therapy and rehabilitation treatment.  Tylenol 650mg TID, PRN pain.  Voltaren 1% topical gel, apply to affected area TID, PRN pain.  Follow up in 6 weeks.           Demetrio Billy IV, MD   of Clinical Orthopedics  Department of Orthopedic Surgery  [facility]  Office: [phone]  Website: [email or web address]      Orders Placed This Encounter    Ambulatory referral/consult to Physical/Occupational Therapy    diclofenac sodium (VOLTAREN) 1 % Gel

## 2025-01-15 ENCOUNTER — OFFICE VISIT (OUTPATIENT)
Dept: PRIMARY CARE CLINIC | Facility: CLINIC | Age: 40
End: 2025-01-15
Payer: MEDICAID

## 2025-01-15 VITALS
HEART RATE: 75 BPM | BODY MASS INDEX: 28.46 KG/M2 | TEMPERATURE: 99 F | HEIGHT: 70 IN | SYSTOLIC BLOOD PRESSURE: 135 MMHG | OXYGEN SATURATION: 94 % | RESPIRATION RATE: 16 BRPM | DIASTOLIC BLOOD PRESSURE: 76 MMHG | WEIGHT: 198.81 LBS

## 2025-01-15 DIAGNOSIS — Z76.89 ENCOUNTER TO ESTABLISH CARE: Primary | ICD-10-CM

## 2025-01-15 DIAGNOSIS — Z13.0 SCREENING FOR IRON DEFICIENCY ANEMIA: ICD-10-CM

## 2025-01-15 DIAGNOSIS — K44.9 HIATAL HERNIA: ICD-10-CM

## 2025-01-15 DIAGNOSIS — K21.9 GASTROESOPHAGEAL REFLUX DISEASE WITHOUT ESOPHAGITIS: ICD-10-CM

## 2025-01-15 DIAGNOSIS — Z13.220 NEED FOR LIPID SCREENING: ICD-10-CM

## 2025-01-15 DIAGNOSIS — Z11.4 ENCOUNTER FOR SCREENING FOR HIV: ICD-10-CM

## 2025-01-15 DIAGNOSIS — Z13.21 ENCOUNTER FOR VITAMIN DEFICIENCY SCREENING: ICD-10-CM

## 2025-01-15 DIAGNOSIS — Z13.29 THYROID DISORDER SCREENING: ICD-10-CM

## 2025-01-15 DIAGNOSIS — Z13.1 SCREENING FOR DIABETES MELLITUS (DM): ICD-10-CM

## 2025-01-15 DIAGNOSIS — Z11.59 ENCOUNTER FOR HEPATITIS C SCREENING TEST FOR LOW RISK PATIENT: ICD-10-CM

## 2025-01-15 DIAGNOSIS — M19.90 ARTHRITIS: ICD-10-CM

## 2025-01-15 DIAGNOSIS — M79.602 LEFT ARM PAIN: ICD-10-CM

## 2025-01-15 DIAGNOSIS — F90.2 ATTENTION DEFICIT HYPERACTIVITY DISORDER (ADHD), COMBINED TYPE: ICD-10-CM

## 2025-01-15 PROBLEM — E83.51 HYPOCALCEMIA: Status: ACTIVE | Noted: 2025-01-15

## 2025-01-15 LAB
25(OH)D3+25(OH)D2 SERPL-MCNC: 33 NG/ML (ref 30–96)
ALBUMIN SERPL BCP-MCNC: 4 G/DL (ref 3.5–5.2)
ALBUMIN/CREAT UR: 6.3 UG/MG (ref 0–30)
ALP SERPL-CCNC: 70 U/L (ref 55–135)
ALT SERPL W/O P-5'-P-CCNC: 31 U/L (ref 10–44)
ANION GAP SERPL CALC-SCNC: 7 MMOL/L (ref 8–16)
AST SERPL-CCNC: 27 U/L (ref 10–40)
BASOPHILS # BLD AUTO: 0.02 K/UL (ref 0–0.2)
BASOPHILS NFR BLD: 0.4 % (ref 0–1.9)
BILIRUB SERPL-MCNC: 0.4 MG/DL (ref 0.1–1)
BUN SERPL-MCNC: 12 MG/DL (ref 6–20)
CALCIUM SERPL-MCNC: 8.6 MG/DL (ref 8.7–10.5)
CHLORIDE SERPL-SCNC: 107 MMOL/L (ref 95–110)
CHOLEST SERPL-MCNC: 128 MG/DL (ref 120–199)
CHOLEST/HDLC SERPL: 3.7 {RATIO} (ref 2–5)
CO2 SERPL-SCNC: 26 MMOL/L (ref 23–29)
CREAT SERPL-MCNC: 1.1 MG/DL (ref 0.5–1.4)
CREAT UR-MCNC: 159.5 MG/DL (ref 23–375)
DIFFERENTIAL METHOD BLD: NORMAL
EOSINOPHIL # BLD AUTO: 0.3 K/UL (ref 0–0.5)
EOSINOPHIL NFR BLD: 6.5 % (ref 0–8)
ERYTHROCYTE [DISTWIDTH] IN BLOOD BY AUTOMATED COUNT: 12.9 % (ref 11.5–14.5)
EST. GFR  (NO RACE VARIABLE): >60 ML/MIN/1.73 M^2
ESTIMATED AVG GLUCOSE: 105 MG/DL (ref 68–131)
GLUCOSE SERPL-MCNC: 93 MG/DL (ref 70–110)
HBA1C MFR BLD: 5.3 % (ref 4–5.6)
HCT VFR BLD AUTO: 46.1 % (ref 40–54)
HCV AB SERPL QL IA: NORMAL
HDLC SERPL-MCNC: 35 MG/DL (ref 40–75)
HDLC SERPL: 27.3 % (ref 20–50)
HGB BLD-MCNC: 15.7 G/DL (ref 14–18)
HIV 1+2 AB+HIV1 P24 AG SERPL QL IA: NORMAL
IMM GRANULOCYTES # BLD AUTO: 0.01 K/UL (ref 0–0.04)
IMM GRANULOCYTES NFR BLD AUTO: 0.2 % (ref 0–0.5)
LDLC SERPL CALC-MCNC: 62.6 MG/DL (ref 63–159)
LYMPHOCYTES # BLD AUTO: 1.5 K/UL (ref 1–4.8)
LYMPHOCYTES NFR BLD: 30.8 % (ref 18–48)
MCH RBC QN AUTO: 29.6 PG (ref 27–31)
MCHC RBC AUTO-ENTMCNC: 34.1 G/DL (ref 32–36)
MCV RBC AUTO: 87 FL (ref 82–98)
MICROALBUMIN UR DL<=1MG/L-MCNC: 10 UG/ML
MONOCYTES # BLD AUTO: 0.5 K/UL (ref 0.3–1)
MONOCYTES NFR BLD: 9.8 % (ref 4–15)
NEUTROPHILS # BLD AUTO: 2.6 K/UL (ref 1.8–7.7)
NEUTROPHILS NFR BLD: 52.3 % (ref 38–73)
NONHDLC SERPL-MCNC: 93 MG/DL
NRBC BLD-RTO: 0 /100 WBC
PLATELET # BLD AUTO: 205 K/UL (ref 150–450)
PMV BLD AUTO: 9.5 FL (ref 9.2–12.9)
POTASSIUM SERPL-SCNC: 3.8 MMOL/L (ref 3.5–5.1)
PROT SERPL-MCNC: 6.7 G/DL (ref 6–8.4)
RBC # BLD AUTO: 5.3 M/UL (ref 4.6–6.2)
SODIUM SERPL-SCNC: 140 MMOL/L (ref 136–145)
TRIGL SERPL-MCNC: 152 MG/DL (ref 30–150)
TSH SERPL DL<=0.005 MIU/L-ACNC: 1.64 UIU/ML (ref 0.4–4)
WBC # BLD AUTO: 4.9 K/UL (ref 3.9–12.7)

## 2025-01-15 PROCEDURE — 99204 OFFICE O/P NEW MOD 45 MIN: CPT | Mod: S$PBB,,, | Performed by: NURSE PRACTITIONER

## 2025-01-15 PROCEDURE — 82306 VITAMIN D 25 HYDROXY: CPT | Performed by: NURSE PRACTITIONER

## 2025-01-15 PROCEDURE — 82043 UR ALBUMIN QUANTITATIVE: CPT | Performed by: NURSE PRACTITIONER

## 2025-01-15 PROCEDURE — 87389 HIV-1 AG W/HIV-1&-2 AB AG IA: CPT | Performed by: NURSE PRACTITIONER

## 2025-01-15 PROCEDURE — 85025 COMPLETE CBC W/AUTO DIFF WBC: CPT | Performed by: NURSE PRACTITIONER

## 2025-01-15 PROCEDURE — 1160F RVW MEDS BY RX/DR IN RCRD: CPT | Mod: CPTII,,, | Performed by: NURSE PRACTITIONER

## 2025-01-15 PROCEDURE — 3008F BODY MASS INDEX DOCD: CPT | Mod: CPTII,,, | Performed by: NURSE PRACTITIONER

## 2025-01-15 PROCEDURE — 84443 ASSAY THYROID STIM HORMONE: CPT | Performed by: NURSE PRACTITIONER

## 2025-01-15 PROCEDURE — 3078F DIAST BP <80 MM HG: CPT | Mod: CPTII,,, | Performed by: NURSE PRACTITIONER

## 2025-01-15 PROCEDURE — 80061 LIPID PANEL: CPT | Performed by: NURSE PRACTITIONER

## 2025-01-15 PROCEDURE — 99999PBSHW PR PBB SHADOW TECHNICAL ONLY FILED TO HB: Mod: PBBFAC,,,

## 2025-01-15 PROCEDURE — 83036 HEMOGLOBIN GLYCOSYLATED A1C: CPT | Performed by: NURSE PRACTITIONER

## 2025-01-15 PROCEDURE — 1159F MED LIST DOCD IN RCRD: CPT | Mod: CPTII,,, | Performed by: NURSE PRACTITIONER

## 2025-01-15 PROCEDURE — 36415 COLL VENOUS BLD VENIPUNCTURE: CPT | Mod: PBBFAC

## 2025-01-15 PROCEDURE — 36415 COLL VENOUS BLD VENIPUNCTURE: CPT | Performed by: NURSE PRACTITIONER

## 2025-01-15 PROCEDURE — 3075F SYST BP GE 130 - 139MM HG: CPT | Mod: CPTII,,, | Performed by: NURSE PRACTITIONER

## 2025-01-15 PROCEDURE — 86803 HEPATITIS C AB TEST: CPT | Performed by: NURSE PRACTITIONER

## 2025-01-15 PROCEDURE — 80053 COMPREHEN METABOLIC PANEL: CPT | Performed by: NURSE PRACTITIONER

## 2025-01-15 PROCEDURE — 99999 PR PBB SHADOW E&M-EST. PATIENT-LVL V: CPT | Mod: PBBFAC,,, | Performed by: NURSE PRACTITIONER

## 2025-01-15 PROCEDURE — 99215 OFFICE O/P EST HI 40 MIN: CPT | Mod: PBBFAC | Performed by: NURSE PRACTITIONER

## 2025-01-15 RX ORDER — PANTOPRAZOLE SODIUM 40 MG/1
40 TABLET, DELAYED RELEASE ORAL DAILY
COMMUNITY
Start: 2024-10-03 | End: 2025-01-15 | Stop reason: SDUPTHER

## 2025-01-15 RX ORDER — PANTOPRAZOLE SODIUM 40 MG/1
40 TABLET, DELAYED RELEASE ORAL DAILY
Qty: 30 TABLET | Refills: 11 | Status: SHIPPED | OUTPATIENT
Start: 2025-01-15

## 2025-01-15 RX ORDER — DEXTROAMPHETAMINE SACCHARATE, AMPHETAMINE ASPARTATE, DEXTROAMPHETAMINE SULFATE AND AMPHETAMINE SULFATE 2.5; 2.5; 2.5; 2.5 MG/1; MG/1; MG/1; MG/1
10 TABLET ORAL DAILY
Qty: 30 TABLET | Refills: 0 | Status: SHIPPED | OUTPATIENT
Start: 2025-01-15 | End: 2025-02-14

## 2025-01-15 NOTE — PROGRESS NOTES
[facility] Note    HPI:  Syed Cm is a 39 y.o. male who presents today for Establish Care (Pt here to establish care)     Would like med for ADHD, also c/o pain to entire left arm    Review of Systems   Constitutional: Negative.    HENT: Negative.     Eyes: Negative.    Respiratory: Negative.     Cardiovascular: Negative.    Gastrointestinal: Negative.    Genitourinary: Negative.    Musculoskeletal:  Positive for joint pain (left arm).   Skin: Negative.    Neurological: Negative.    Endo/Heme/Allergies: Negative.    Psychiatric/Behavioral: Negative.        A review of systems was performed and was negative except as noted above.    I personally reviewed allergies, past medical, surgical, social and family history and updated as appropriate.    Medications:    Current Outpatient Medications:     diclofenac sodium (VOLTAREN) 1 % Gel, Apply 2 g topically 3 (three) times daily., Disp: 100 g, Rfl: 2    ibuprofen (ADVIL,MOTRIN) 800 MG tablet, Take 1 tablet (800 mg total) by mouth every 8 (eight) hours as needed for Pain., Disp: 30 tablet, Rfl: 0    dextroamphetamine-amphetamine (ADDERALL) 10 mg Tab, Take 1 tablet (10 mg total) by mouth once daily., Disp: 30 tablet, Rfl: 0    pantoprazole (PROTONIX) 40 MG tablet, Take 1 tablet (40 mg total) by mouth once daily., Disp: 30 tablet, Rfl: 11     Health Maintenance:  Immunization History   Administered Date(s) Administered    Td (ADULT) 06/01/2018    Tdap 08/25/2016, 12/01/2023      Health Maintenance   Topic Date Due    Hepatitis C Screening  Never done    Lipid Panel  Never done    HIV Screening  Never done    Hemoglobin A1c (Diabetic Prevention Screening)  Never done    COVID-19 Vaccine (1 - 2024-25 season) 01/15/2026 (Originally 9/1/2024)    TETANUS VACCINE  12/01/2033    RSV Vaccine (Age 60+ and Pregnant patients) (1 - 1-dose 75+ series) 06/29/2058    Pneumococcal Vaccines (Age 0-49)  Aged Out    Influenza Vaccine  Discontinued     Health  "Maintenance Topics with due status: Not Due       Topic Last Completion Date    TETANUS VACCINE 12/01/2023    RSV Vaccine (Age 60+ and Pregnant patients) Not Due     Health Maintenance Due   Topic Date Due    Hepatitis C Screening  Never done    Lipid Panel  Never done    HIV Screening  Never done    Hemoglobin A1c (Diabetic Prevention Screening)  Never done       PHYSICAL EXAM:  Vitals:    01/15/25 0953   BP: 135/76   BP Location: Left arm   Patient Position: Sitting   Pulse: 75   Resp: 16   Temp: 98.6 °F (37 °C)   TempSrc: Temporal   SpO2: (!) 94%   Weight: 90.2 kg (198 lb 12.8 oz)   Height: 5' 10" (1.778 m)     Body mass index is 28.52 kg/m².  Physical Exam  Constitutional:       Appearance: Normal appearance. He is normal weight.   HENT:      Head: Normocephalic.      Right Ear: Tympanic membrane, ear canal and external ear normal.      Left Ear: Tympanic membrane, ear canal and external ear normal.      Nose: Nose normal.      Mouth/Throat:      Mouth: Mucous membranes are moist.   Cardiovascular:      Rate and Rhythm: Normal rate and regular rhythm.      Pulses: Normal pulses.      Heart sounds: Normal heart sounds.   Pulmonary:      Effort: Pulmonary effort is normal.      Breath sounds: Normal breath sounds.   Musculoskeletal:         General: Normal range of motion.      Cervical back: Normal range of motion.   Skin:     General: Skin is warm and dry.   Neurological:      General: No focal deficit present.      Mental Status: He is alert and oriented to person, place, and time.          ASSESSMENT/PLAN:  1. Encounter to establish care    2. Encounter for screening for HIV  -     HIV 1/2 Ag/Ab (4th Gen); Future; Expected date: 01/15/2025    3. Encounter for hepatitis C screening test for low risk patient  -     Hepatitis C Antibody; Future; Expected date: 01/15/2025    4. Screening for diabetes mellitus (DM)  -     Comprehensive Metabolic Panel; Future; Expected date: 01/15/2025  -     Hemoglobin A1C; " Future; Expected date: 01/15/2025  -     Microalbumin/Creatinine Ratio, Urine; Future; Expected date: 01/15/2025    5. Encounter for vitamin deficiency screening  -     Misc Sendout Test, Blood Vitamin D; Future; Expected date: 01/15/2025    6. Screening for iron deficiency anemia  -     CBC Auto Differential; Future; Expected date: 01/15/2025    7. Thyroid disorder screening  -     TSH; Future; Expected date: 01/15/2025    8. Need for lipid screening  -     Lipid Panel; Future; Expected date: 01/15/2025    9. Left arm pain  -     Ambulatory referral/consult to Orthopedics; Future; Expected date: 01/15/2025  -     X-Ray Wrist Complete Left; Future; Expected date: 01/15/2025  -     X-Ray Elbow Complete Left; Future; Expected date: 01/15/2025  -     X-Ray Scapula Left; Future; Expected date: 01/15/2025    10. Hiatal hernia    11. Gastroesophageal reflux disease without esophagitis  -     pantoprazole (PROTONIX) 40 MG tablet; Take 1 tablet (40 mg total) by mouth once daily.  Dispense: 30 tablet; Refill: 11    12. Arthritis    13. Attention deficit hyperactivity disorder (ADHD), combined type  -     dextroamphetamine-amphetamine (ADDERALL) 10 mg Tab; Take 1 tablet (10 mg total) by mouth once daily.  Dispense: 30 tablet; Refill: 0        Other than changes above, continue current medications and maintain follow up with specialists.      Follow up if symptoms worsen or fail to improve.   No results found for this or any previous visit (from the past 12 weeks).      KIRT Bailey  [facility]

## 2025-01-16 ENCOUNTER — TELEPHONE (OUTPATIENT)
Dept: PRIMARY CARE CLINIC | Facility: CLINIC | Age: 40
End: 2025-01-16
Payer: MEDICAID

## 2025-01-17 ENCOUNTER — OFFICE VISIT (OUTPATIENT)
Dept: ORTHOPEDICS | Facility: CLINIC | Age: 40
End: 2025-01-17
Payer: MEDICAID

## 2025-01-17 DIAGNOSIS — M79.602 LEFT ARM PAIN: ICD-10-CM

## 2025-01-17 DIAGNOSIS — M25.532 WRIST PAIN, ACUTE, LEFT: ICD-10-CM

## 2025-01-17 DIAGNOSIS — M25.522 LEFT ELBOW PAIN: Primary | ICD-10-CM

## 2025-01-17 PROCEDURE — 3044F HG A1C LEVEL LT 7.0%: CPT | Mod: CPTII,,, | Performed by: NURSE PRACTITIONER

## 2025-01-17 PROCEDURE — 99213 OFFICE O/P EST LOW 20 MIN: CPT | Mod: PBBFAC | Performed by: NURSE PRACTITIONER

## 2025-01-17 PROCEDURE — 1160F RVW MEDS BY RX/DR IN RCRD: CPT | Mod: CPTII,,, | Performed by: NURSE PRACTITIONER

## 2025-01-17 PROCEDURE — 99999 PR PBB SHADOW E&M-EST. PATIENT-LVL III: CPT | Mod: PBBFAC,,, | Performed by: NURSE PRACTITIONER

## 2025-01-17 PROCEDURE — 99213 OFFICE O/P EST LOW 20 MIN: CPT | Mod: S$PBB,,, | Performed by: NURSE PRACTITIONER

## 2025-01-17 PROCEDURE — 1159F MED LIST DOCD IN RCRD: CPT | Mod: CPTII,,, | Performed by: NURSE PRACTITIONER

## 2025-01-17 RX ORDER — PREDNISONE 10 MG/1
10 TABLET ORAL SEE ADMIN INSTRUCTIONS
Qty: 7 TABLET | Refills: 0 | Status: SHIPPED | OUTPATIENT
Start: 2025-01-17

## 2025-01-17 NOTE — PROGRESS NOTES
Subjective:      Syed Cm is a 39 y.o. male who presents with left elbow and forearm pain. He states DOI was around 3 weeks ago. He states that he was grabbing something out of his truck and all of a sudden felt pain radiating from the upper arm down to the hand. He states that he noted a red streak in his arm from his biceps extending down to the wrist. He states that has resolved since onset. He states that he has the most tenderness in the wrist and hand area but also extends into the elbow and upper arm. He states that he has not had much improvement since onset. He rates his pain as 5/10, worse with movement. Current symptoms include:  diffuse tenderness in the upper arm, elbow, forearm and wrist . Pain is aggravated by: grasping, ROM . Symptoms have gradually worsened. Treatment to date: avoidance of offending activity.    Medical History:  Past Medical History:   Diagnosis Date    Hiatal hernia        Surgical History:  Past Surgical History:   Procedure Laterality Date    ULNAR NERVE REPAIR Right        Family History:  No family history on file.    Allergies:   Review of patient's allergies indicates:  No Known Allergies      Review of Systems  Musculoskeletal:negative  Neurological: negative  Behavioral/Psych: negative     Objective:     NVI  Right elbow: without deformity and full active ROM   Left elbow:  No appreciable swelling in the arm noted.   Mild tenderness in the AC space of the elbow. I was able to feel the distal biceps and there was no biceps deformity.   No elbow laxity. AROM elbow was slightly decreased.   TTP distal forearm and dorsal wrist. AROM wrist was decreased due to guarding.    Brisk cap refill.    X-ray Left humerus and forearm was ordered and pending.    Assessment:   Left upper arm pain  Left elbow pain  Left forearm pain    Plan:     Radiographs ordered and pending.   Exam consistent with suspected strain of left upper arm and forearm.  Rx- Prednisone 10 mg as  directed. Continue Nsaid as previous.   Heat treatment as directed. Limit lifting as directed.   RTC 2 weeks for follow up.   He had no further questions.

## 2025-01-28 ENCOUNTER — OFFICE VISIT (OUTPATIENT)
Dept: ORTHOPEDICS | Facility: CLINIC | Age: 40
End: 2025-01-28
Payer: MEDICAID

## 2025-01-28 ENCOUNTER — HOSPITAL ENCOUNTER (OUTPATIENT)
Dept: RADIOLOGY | Facility: HOSPITAL | Age: 40
Discharge: HOME | End: 2025-01-28
Attending: NURSE PRACTITIONER
Payer: MEDICAID

## 2025-01-28 DIAGNOSIS — M25.522 LEFT ELBOW PAIN: ICD-10-CM

## 2025-01-28 DIAGNOSIS — M79.602 LEFT ARM PAIN: ICD-10-CM

## 2025-01-28 DIAGNOSIS — M62.838 MUSCLE SPASM OF BOTH LOWER LEGS: Primary | ICD-10-CM

## 2025-01-28 DIAGNOSIS — M25.532 WRIST PAIN, ACUTE, LEFT: ICD-10-CM

## 2025-01-28 PROCEDURE — 1160F RVW MEDS BY RX/DR IN RCRD: CPT | Mod: CPTII,,, | Performed by: NURSE PRACTITIONER

## 2025-01-28 PROCEDURE — 73090 X-RAY EXAM OF FOREARM: CPT | Mod: TC,LT

## 2025-01-28 PROCEDURE — 3044F HG A1C LEVEL LT 7.0%: CPT | Mod: CPTII,,, | Performed by: NURSE PRACTITIONER

## 2025-01-28 PROCEDURE — 99213 OFFICE O/P EST LOW 20 MIN: CPT | Mod: S$PBB,,, | Performed by: NURSE PRACTITIONER

## 2025-01-28 PROCEDURE — 1159F MED LIST DOCD IN RCRD: CPT | Mod: CPTII,,, | Performed by: NURSE PRACTITIONER

## 2025-01-28 PROCEDURE — 99212 OFFICE O/P EST SF 10 MIN: CPT | Mod: PBBFAC,25 | Performed by: NURSE PRACTITIONER

## 2025-01-28 PROCEDURE — 73060 X-RAY EXAM OF HUMERUS: CPT | Mod: TC,LT

## 2025-01-28 PROCEDURE — 99999 PR PBB SHADOW E&M-EST. PATIENT-LVL II: CPT | Mod: PBBFAC,,, | Performed by: NURSE PRACTITIONER

## 2025-01-28 RX ORDER — METHOCARBAMOL 500 MG/1
500 TABLET, FILM COATED ORAL 4 TIMES DAILY PRN
Qty: 40 TABLET | Refills: 0 | Status: SHIPPED | OUTPATIENT
Start: 2025-01-28 | End: 2025-02-07

## 2025-01-28 NOTE — PROGRESS NOTES
Subjective:      Follow up visit: Left elbow and forearm pain:    Syed Cm is a 39 y.o. male returns for follow up for previous left elbow and forearm pain. DOI was around 4-5 weeks ago. He presents and states that he is doing better. He states that the tenderness in the wrist and hand area is almost resolved. He reports normal ROM in the wrist and fingers. He states that he has some numbness in the small finger but has a history of cubital tunnel surgery. He states that he continues with mild pain in the elbow. He still feels soreness in the elbow with certain motions.     He states that he is also having problems with spasms in his thighs. He states that this has been an ongoing issue over the past few months. He states that the spasms are worse at night time. He has not had any treatments. He denies any injuries.      Medical History:       Past Medical History:   Diagnosis Date    Hiatal hernia           Surgical History:        Past Surgical History:   Procedure Laterality Date    ULNAR NERVE REPAIR Right           Family History:  No family history on file.     Allergies:   Review of patient's allergies indicates:  No Known Allergies        Review of Systems  Musculoskeletal:negative  Neurological: negative  Behavioral/Psych: negative        Objective:      NVI  Normal gait  Right elbow: without deformity and full active ROM   Left elbow:  No appreciable swelling in the arm noted.   Minimal tenderness in the AC space of the elbow. Intact distal biceps and there was no biceps deformity.   No elbow laxity. AROM elbow was improved.  TTP distal forearm and dorsal wrist has resolved. AROM wrist was improved.    Brisk cap refill.     X-ray Left humerus and forearm was reviewed from today    Left Humerus:  No fracture or dislocation.   Unremarkable soft tissues.     Impression:     No acute abnormality.     Left Forearm:  No fracture or dislocation.   Unremarkable soft tissues.     Impression:     No  acute abnormality.        Assessment:   Left elbow and forearm pain, unspecified  Residual LT cubital tunnel  Spasms in lower extremities     Plan:      Radiographs were negative.   The left upper extremity is clinically improving.   Continue Nsaid as previous. Rx- Robaxin prn spasms in the legs.   RTC 2 if needed, consider MRI of the elbow for any regression.  He had no further questions.

## 2025-02-04 DIAGNOSIS — F90.2 ATTENTION DEFICIT HYPERACTIVITY DISORDER (ADHD), COMBINED TYPE: Primary | ICD-10-CM

## 2025-02-04 RX ORDER — DEXTROAMPHETAMINE SACCHARATE, AMPHETAMINE ASPARTATE, DEXTROAMPHETAMINE SULFATE AND AMPHETAMINE SULFATE 5; 5; 5; 5 MG/1; MG/1; MG/1; MG/1
1 TABLET ORAL DAILY
Qty: 30 TABLET | Refills: 0 | Status: SHIPPED | OUTPATIENT
Start: 2025-02-04 | End: 2025-02-17 | Stop reason: DRUGHIGH

## 2025-02-12 DIAGNOSIS — K21.9 GASTROESOPHAGEAL REFLUX DISEASE WITHOUT ESOPHAGITIS: ICD-10-CM

## 2025-02-12 RX ORDER — PANTOPRAZOLE SODIUM 40 MG/1
40 TABLET, DELAYED RELEASE ORAL DAILY
Qty: 30 TABLET | Refills: 11 | Status: SHIPPED | OUTPATIENT
Start: 2025-02-12

## 2025-02-12 NOTE — TELEPHONE ENCOUNTER
----- Message from Lauren sent at 2/12/2025  8:33 AM CST -----  Regarding: Refill  pantoprazole (PROTONIX) 40 MG tablet    Clinic  
Walk in

## 2025-02-17 ENCOUNTER — OFFICE VISIT (OUTPATIENT)
Dept: PRIMARY CARE CLINIC | Facility: CLINIC | Age: 40
End: 2025-02-17
Payer: MEDICAID

## 2025-02-17 VITALS
WEIGHT: 199 LBS | SYSTOLIC BLOOD PRESSURE: 148 MMHG | OXYGEN SATURATION: 99 % | HEART RATE: 65 BPM | DIASTOLIC BLOOD PRESSURE: 67 MMHG | HEIGHT: 70 IN | BODY MASS INDEX: 28.49 KG/M2

## 2025-02-17 DIAGNOSIS — G43.909 MIGRAINE WITHOUT STATUS MIGRAINOSUS, NOT INTRACTABLE, UNSPECIFIED MIGRAINE TYPE: ICD-10-CM

## 2025-02-17 DIAGNOSIS — F90.2 ATTENTION DEFICIT HYPERACTIVITY DISORDER (ADHD), COMBINED TYPE: Primary | ICD-10-CM

## 2025-02-17 PROCEDURE — 99213 OFFICE O/P EST LOW 20 MIN: CPT | Mod: PBBFAC | Performed by: NURSE PRACTITIONER

## 2025-02-17 RX ORDER — CHOLECALCIFEROL (VITAMIN D3) 25 MCG
1000 TABLET ORAL DAILY
COMMUNITY

## 2025-02-17 RX ORDER — SUMATRIPTAN SUCCINATE 50 MG/1
50 TABLET ORAL ONCE
Qty: 9 TABLET | Refills: 0 | Status: SHIPPED | OUTPATIENT
Start: 2025-02-17 | End: 2025-02-17

## 2025-02-17 RX ORDER — DEXTROAMPHETAMINE SACCHARATE, AMPHETAMINE ASPARTATE, DEXTROAMPHETAMINE SULFATE AND AMPHETAMINE SULFATE 7.5; 7.5; 7.5; 7.5 MG/1; MG/1; MG/1; MG/1
30 TABLET ORAL DAILY
Qty: 30 TABLET | Refills: 0 | Status: SHIPPED | OUTPATIENT
Start: 2025-02-17

## 2025-02-17 RX ORDER — MAGNESIUM GLUCONATE 27.5 (500)
500 TABLET ORAL DAILY
COMMUNITY

## 2025-02-17 NOTE — PROGRESS NOTES
"[facility] Note    HPI:  Syed Cm is a 39 y.o. male who presents today for Medication Refill (Medication check)    Has been having migraines lately, also requests increase dose of ADHD med    Review of Systems   Constitutional: Negative.    HENT: Negative.     Eyes: Negative.    Respiratory: Negative.     Cardiovascular: Negative.    Gastrointestinal: Negative.    Genitourinary: Negative.    Musculoskeletal: Negative.    Skin: Negative.    Neurological:  Positive for headaches.   Endo/Heme/Allergies: Negative.    Psychiatric/Behavioral: Negative.        A review of systems was performed and was negative except as noted above.    I personally reviewed allergies, past medical, surgical, social and family history and updated as appropriate.    Medications:  Current Medications[1]     Health Maintenance:  Immunization History   Administered Date(s) Administered    Td (ADULT) 06/01/2018    Tdap 08/25/2016, 12/01/2023      Health Maintenance   Topic Date Due    COVID-19 Vaccine (1 - 2024-25 season) 01/15/2026 (Originally 9/1/2024)    Hemoglobin A1c (Diabetic Prevention Screening)  01/15/2028    Lipid Panel  01/15/2030    TETANUS VACCINE  12/01/2033    RSV Vaccine (Age 60+ and Pregnant patients) (1 - 1-dose 75+ series) 06/29/2058    Hepatitis C Screening  Completed    HIV Screening  Completed    Pneumococcal Vaccines (Age 0-49)  Aged Out    Influenza Vaccine  Discontinued     Health Maintenance Topics with due status: Not Due       Topic Last Completion Date    TETANUS VACCINE 12/01/2023    Hemoglobin A1c (Diabetic Prevention Screening) 01/15/2025    Lipid Panel 01/15/2025    RSV Vaccine (Age 60+ and Pregnant patients) Not Due     There are no preventive care reminders to display for this patient.    PHYSICAL EXAM:  Vitals:    02/17/25 0848   BP: (!) 148/67   Patient Position: Sitting   Pulse: 65   SpO2: 99%   Weight: 90.3 kg (199 lb)   Height: 5' 10" (1.778 m)     Body mass index is 28.55 " kg/m².  Physical Exam  Constitutional:       Appearance: Normal appearance. He is normal weight.   HENT:      Head: Normocephalic.      Nose: Nose normal.      Mouth/Throat:      Mouth: Mucous membranes are moist.   Cardiovascular:      Rate and Rhythm: Normal rate and regular rhythm.      Pulses: Normal pulses.      Heart sounds: Normal heart sounds.   Pulmonary:      Effort: Pulmonary effort is normal.      Breath sounds: Normal breath sounds.   Musculoskeletal:         General: Normal range of motion.      Cervical back: Normal range of motion.   Skin:     General: Skin is warm and dry.   Neurological:      General: No focal deficit present.      Mental Status: He is alert and oriented to person, place, and time.          ASSESSMENT/PLAN:  1. Attention deficit hyperactivity disorder (ADHD), combined type  -     dextroamphetamine-amphetamine (ADDERALL) 30 mg Tab; Take 1 tablet (30 mg total) by mouth once daily.  Dispense: 30 tablet; Refill: 0    2. Migraine without status migrainosus, not intractable, unspecified migraine type  -     Ambulatory referral/consult to Neurology; Future; Expected date: 02/17/2025  -     sumatriptan (IMITREX) 50 MG tablet; Take 1 tablet (50 mg total) by mouth once. for 1 dose  Dispense: 9 tablet; Refill: 0        Other than changes above, continue current medications and maintain follow up with specialists.      Follow up in about 3 months (around 5/17/2025).   Recent Results (from the past 12 weeks)   Hepatitis C Antibody    Collection Time: 01/15/25 10:24 AM   Result Value Ref Range    Hepatitis C Ab Non-reactive Non-reactive   HIV 1/2 Ag/Ab (4th Gen)    Collection Time: 01/15/25 10:24 AM   Result Value Ref Range    HIV 1/2 Ag/Ab Non-reactive Non-reactive   Hemoglobin A1C    Collection Time: 01/15/25 10:24 AM   Result Value Ref Range    Hemoglobin A1C 5.3 4.0 - 5.6 %    Estimated Avg Glucose 105 68 - 131 mg/dL   Lipid Panel    Collection Time: 01/15/25 10:24 AM   Result Value Ref  Range    Cholesterol 128 120 - 199 mg/dL    Triglycerides 152 (H) 30 - 150 mg/dL    HDL 35 (L) 40 - 75 mg/dL    LDL Cholesterol 62.6 (L) 63.0 - 159.0 mg/dL    HDL/Cholesterol Ratio 27.3 20.0 - 50.0 %    Total Cholesterol/HDL Ratio 3.7 2.0 - 5.0    Non-HDL Cholesterol 93 mg/dL   TSH    Collection Time: 01/15/25 10:24 AM   Result Value Ref Range    TSH 1.641 0.400 - 4.000 uIU/mL   CBC Auto Differential    Collection Time: 01/15/25 10:24 AM   Result Value Ref Range    WBC 4.90 3.90 - 12.70 K/uL    RBC 5.30 4.60 - 6.20 M/uL    Hemoglobin 15.7 14.0 - 18.0 g/dL    Hematocrit 46.1 40.0 - 54.0 %    MCV 87 82 - 98 fL    MCH 29.6 27.0 - 31.0 pg    MCHC 34.1 32.0 - 36.0 g/dL    RDW 12.9 11.5 - 14.5 %    Platelets 205 150 - 450 K/uL    MPV 9.5 9.2 - 12.9 fL    Immature Granulocytes 0.2 0.0 - 0.5 %    Gran # (ANC) 2.6 1.8 - 7.7 K/uL    Immature Grans (Abs) 0.01 0.00 - 0.04 K/uL    Lymph # 1.5 1.0 - 4.8 K/uL    Mono # 0.5 0.3 - 1.0 K/uL    Eos # 0.3 0.0 - 0.5 K/uL    Baso # 0.02 0.00 - 0.20 K/uL    nRBC 0 0 /100 WBC    Gran % 52.3 38.0 - 73.0 %    Lymph % 30.8 18.0 - 48.0 %    Mono % 9.8 4.0 - 15.0 %    Eosinophil % 6.5 0.0 - 8.0 %    Basophil % 0.4 0.0 - 1.9 %    Differential Method Automated    Comprehensive Metabolic Panel    Collection Time: 01/15/25 10:24 AM   Result Value Ref Range    Sodium 140 136 - 145 mmol/L    Potassium 3.8 3.5 - 5.1 mmol/L    Chloride 107 95 - 110 mmol/L    CO2 26 23 - 29 mmol/L    Glucose 93 70 - 110 mg/dL    BUN 12 6 - 20 mg/dL    Creatinine 1.1 0.5 - 1.4 mg/dL    Calcium 8.6 (L) 8.7 - 10.5 mg/dL    Total Protein 6.7 6.0 - 8.4 g/dL    Albumin 4.0 3.5 - 5.2 g/dL    Total Bilirubin 0.4 0.1 - 1.0 mg/dL    Alkaline Phosphatase 70 55 - 135 U/L    AST 27 10 - 40 U/L    ALT 31 10 - 44 U/L    eGFR >60.0 >60 mL/min/1.73 m^2    Anion Gap 7 (L) 8 - 16 mmol/L   Microalbumin/Creatinine Ratio, Urine    Collection Time: 01/15/25 10:24 AM   Result Value Ref Range    Microalbumin, Urine 10.0 ug/mL    Creatinine,  Urine 159.5 23.0 - 375.0 mg/dL    Microalb/Creat Ratio 6.3 0.0 - 30.0 ug/mg   Vitamin D    Collection Time: 01/15/25 10:24 AM   Result Value Ref Range    Vit D, 25-Hydroxy 33 30 - 96 ng/mL         Laure Pagan FNP [facility]                       [1]   Current Outpatient Medications:     ibuprofen (ADVIL,MOTRIN) 800 MG tablet, Take 1 tablet (800 mg total) by mouth every 8 (eight) hours as needed for Pain., Disp: 30 tablet, Rfl: 0    magnesium gluconate 27.5 mg magne- sium (500 mg) Tab, Take 500 mg by mouth once daily., Disp: , Rfl:     methocarbamoL (ROBAXIN) 500 MG Tab, Take 1 tablet (500 mg total) by mouth 4 (four) times daily as needed., Disp: 40 tablet, Rfl: 0    pantoprazole (PROTONIX) 40 MG tablet, Take 1 tablet (40 mg total) by mouth once daily., Disp: 30 tablet, Rfl: 11    vitamin D (VITAMIN D3) 1000 units Tab, Take 1,000 Units by mouth once daily., Disp: , Rfl:     dextroamphetamine-amphetamine (ADDERALL) 30 mg Tab, Take 1 tablet (30 mg total) by mouth once daily., Disp: 30 tablet, Rfl: 0    sumatriptan (IMITREX) 50 MG tablet, Take 1 tablet (50 mg total) by mouth once. for 1 dose, Disp: 9 tablet, Rfl: 0

## 2025-02-21 DIAGNOSIS — M79.604 PAIN IN BOTH LOWER EXTREMITIES: ICD-10-CM

## 2025-02-21 DIAGNOSIS — M79.605 PAIN IN BOTH LOWER EXTREMITIES: ICD-10-CM

## 2025-02-21 DIAGNOSIS — M62.838 MUSCLE SPASM OF BOTH LOWER LEGS: Primary | ICD-10-CM

## 2025-02-21 RX ORDER — METHOCARBAMOL 500 MG/1
500 TABLET, FILM COATED ORAL 4 TIMES DAILY
Qty: 40 TABLET | Refills: 0 | Status: SHIPPED | OUTPATIENT
Start: 2025-02-21 | End: 2025-03-03

## 2025-03-10 DIAGNOSIS — F90.2 ATTENTION DEFICIT HYPERACTIVITY DISORDER (ADHD), COMBINED TYPE: ICD-10-CM

## 2025-03-10 RX ORDER — DEXTROAMPHETAMINE SACCHARATE, AMPHETAMINE ASPARTATE, DEXTROAMPHETAMINE SULFATE AND AMPHETAMINE SULFATE 7.5; 7.5; 7.5; 7.5 MG/1; MG/1; MG/1; MG/1
30 TABLET ORAL DAILY
Qty: 30 TABLET | Refills: 0 | Status: SHIPPED | OUTPATIENT
Start: 2025-03-10

## 2025-03-10 NOTE — TELEPHONE ENCOUNTER
----- Message from Lauren sent at 3/10/2025 10:02 AM CDT -----  Regarding: Refill  dextroamphetamine-amphetamine (ADDERALL) [facility]

## 2025-03-28 PROBLEM — M54.12 CERVICAL RADICULOPATHY: Status: ACTIVE | Noted: 2025-03-28

## 2025-03-28 PROBLEM — G56.23 ULNAR NEUROPATHY OF BOTH UPPER EXTREMITIES: Status: ACTIVE | Noted: 2025-03-28

## 2025-04-01 ENCOUNTER — OFFICE VISIT (OUTPATIENT)
Dept: ORTHOPEDICS | Facility: CLINIC | Age: 40
End: 2025-04-01
Payer: MEDICAID

## 2025-04-01 DIAGNOSIS — M62.830 MUSCLE SPASM OF BACK: ICD-10-CM

## 2025-04-01 DIAGNOSIS — G56.23 CUBITAL TUNNEL SYNDROME, BILATERAL: ICD-10-CM

## 2025-04-01 DIAGNOSIS — G56.03 CARPAL TUNNEL SYNDROME ON BOTH SIDES: ICD-10-CM

## 2025-04-01 DIAGNOSIS — M54.50 ACUTE MIDLINE LOW BACK PAIN WITHOUT SCIATICA: Primary | ICD-10-CM

## 2025-04-01 PROCEDURE — 99999 PR PBB SHADOW E&M-EST. PATIENT-LVL II: CPT | Mod: PBBFAC,,, | Performed by: NURSE PRACTITIONER

## 2025-04-01 PROCEDURE — 99213 OFFICE O/P EST LOW 20 MIN: CPT | Mod: S$PBB,,, | Performed by: NURSE PRACTITIONER

## 2025-04-01 PROCEDURE — 1160F RVW MEDS BY RX/DR IN RCRD: CPT | Mod: CPTII,,, | Performed by: NURSE PRACTITIONER

## 2025-04-01 PROCEDURE — 1159F MED LIST DOCD IN RCRD: CPT | Mod: CPTII,,, | Performed by: NURSE PRACTITIONER

## 2025-04-01 PROCEDURE — 99212 OFFICE O/P EST SF 10 MIN: CPT | Mod: PBBFAC | Performed by: NURSE PRACTITIONER

## 2025-04-01 PROCEDURE — 3044F HG A1C LEVEL LT 7.0%: CPT | Mod: CPTII,,, | Performed by: NURSE PRACTITIONER

## 2025-04-01 RX ORDER — CYCLOBENZAPRINE HCL 10 MG
10 TABLET ORAL 2 TIMES DAILY PRN
Qty: 20 TABLET | Refills: 0 | Status: SHIPPED | OUTPATIENT
Start: 2025-04-01 | End: 2025-04-11

## 2025-04-01 RX ORDER — PREDNISONE 10 MG/1
10 TABLET ORAL SEE ADMIN INSTRUCTIONS
Qty: 7 TABLET | Refills: 0 | Status: SHIPPED | OUTPATIENT
Start: 2025-04-01

## 2025-04-01 NOTE — PROGRESS NOTES
Subjective:      Follow up visit: Bilateral elbow pain, new problem low back pain     Syed Cm is a 39 y.o. male returns for follow up for bilateral elbow pain. He states that the left elbow is doing better. He states that he continues with RT elbow pain and associated numbness in his small finger of his right hand. Again, he has had previous RT cubital tunnel surgery. He states that he continues with mild pain in the elbow as previous. He still feels soreness in the RT elbow with certain motions. He recently had an updated EMG done at [facility]. He states that he is not having numbness in the first three fingers of the hands.     He also has a new problem of low back pain. He states that he pulled a muscle in his back yesterday after bending forward. He states by history he has had previous low back issues. He states that he has had no previous back surgeries. He presents and is having 7/10 pain in the left- central lower back area. He describes the pain as spasm like and throbbing. He denies any radiation of pain in the lower extremities. He states that he had robaxin and has not helped much.      Medical History:          Past Medical History:   Diagnosis Date    Hiatal hernia           Surgical History:            Past Surgical History:   Procedure Laterality Date    ULNAR NERVE REPAIR Right           Family History:  No family history on file.     Allergies:   Review of patient's allergies indicates:  No Known Allergies        Review of Systems  Musculoskeletal:negative  Neurological: negative  Behavioral/Psych: negative        Objective:      NVI  Compensated  gait  Right elbow: No swelling.   Full AROM. Mild TTP cubital canal.   No instability. + tinels  Mild + phalens wrist   Left elbow:  No swelling.  Non-tender in the elbow joint.    No elbow laxity. AROM elbow was improved.  AROM wrist was improved. - tinels elbow  - phalens wrist   Brisk cap refill.    Low back exam:  TTP lumbar and left  paraspinals- + spasm.   Neg SLR. Sensation intact.   AROM was decreased.       EMG  : Reviewed by me today  Revealed bilateral C5 and C6 radicular disease, right greater than left median neuropathies at the wrists and ulnar neuropathies at the elbows        Assessment:   Bilateral cubital syndrome- history RT cubital tunnel surgery  2. Bilateral carpal tunnel syndrome  3. Low back pain, spasm.       Plan:      Upper extremities  EMG was reviewed, consistent with bilateral CTS and cubital tunnel as well as C5/C6 radicular disease.    Referral to the hand center to further evaluate the CTS and cubital tunnel.   Continue Nsaid as previous.   RTC prn for the upper extremities.   He had no further questions.     Low back plan:  Radiographs ordered of the L spine.   Rx- Prednisone and Flexeril as directed. Reviewed dosing and side effects.   Physician directed exercises for the L ow back, packet given.   Alt heat and ice. Limit lifting as directed.   RTC 3 weeks for follow up. Consider MRI if any radicular symptoms should arise.

## 2025-04-10 ENCOUNTER — OFFICE VISIT (OUTPATIENT)
Dept: PRIMARY CARE CLINIC | Facility: CLINIC | Age: 40
End: 2025-04-10
Payer: MEDICAID

## 2025-04-10 VITALS
BODY MASS INDEX: 27.97 KG/M2 | HEIGHT: 70 IN | SYSTOLIC BLOOD PRESSURE: 140 MMHG | DIASTOLIC BLOOD PRESSURE: 83 MMHG | HEART RATE: 87 BPM | OXYGEN SATURATION: 97 % | WEIGHT: 195.38 LBS

## 2025-04-10 DIAGNOSIS — F90.2 ATTENTION DEFICIT HYPERACTIVITY DISORDER (ADHD), COMBINED TYPE: Primary | ICD-10-CM

## 2025-04-10 PROCEDURE — 3044F HG A1C LEVEL LT 7.0%: CPT | Mod: CPTII,,, | Performed by: NURSE PRACTITIONER

## 2025-04-10 PROCEDURE — 99213 OFFICE O/P EST LOW 20 MIN: CPT | Mod: PBBFAC | Performed by: NURSE PRACTITIONER

## 2025-04-10 PROCEDURE — 3079F DIAST BP 80-89 MM HG: CPT | Mod: CPTII,,, | Performed by: NURSE PRACTITIONER

## 2025-04-10 PROCEDURE — 3077F SYST BP >= 140 MM HG: CPT | Mod: CPTII,,, | Performed by: NURSE PRACTITIONER

## 2025-04-10 PROCEDURE — 3008F BODY MASS INDEX DOCD: CPT | Mod: CPTII,,, | Performed by: NURSE PRACTITIONER

## 2025-04-10 PROCEDURE — 99214 OFFICE O/P EST MOD 30 MIN: CPT | Mod: S$PBB,,, | Performed by: NURSE PRACTITIONER

## 2025-04-10 PROCEDURE — 99999 PR PBB SHADOW E&M-EST. PATIENT-LVL III: CPT | Mod: PBBFAC,,, | Performed by: NURSE PRACTITIONER

## 2025-04-10 RX ORDER — DEXTROAMPHETAMINE SACCHARATE, AMPHETAMINE ASPARTATE, DEXTROAMPHETAMINE SULFATE AND AMPHETAMINE SULFATE 7.5; 7.5; 7.5; 7.5 MG/1; MG/1; MG/1; MG/1
30 TABLET ORAL DAILY
Qty: 30 TABLET | Refills: 0 | Status: SHIPPED | OUTPATIENT
Start: 2025-04-10

## 2025-04-10 RX ORDER — DEXTROAMPHETAMINE SACCHARATE, AMPHETAMINE ASPARTATE, DEXTROAMPHETAMINE SULFATE AND AMPHETAMINE SULFATE 2.5; 2.5; 2.5; 2.5 MG/1; MG/1; MG/1; MG/1
10 TABLET ORAL
Qty: 30 TABLET | Refills: 0 | Status: SHIPPED | OUTPATIENT
Start: 2025-04-10

## 2025-04-10 NOTE — PROGRESS NOTES
"[facility] Note    HPI:  Syed Cm is a 39 y.o. male who presents today for Medication Refill (Pt here for med refill . Med is not lasting all day)         Review of Systems   Constitutional: Negative.    HENT: Negative.     Eyes: Negative.    Respiratory: Negative.     Cardiovascular: Negative.    Gastrointestinal: Negative.    Genitourinary: Negative.    Musculoskeletal: Negative.    Skin: Negative.    Neurological: Negative.    Endo/Heme/Allergies: Negative.    Psychiatric/Behavioral: Negative.        A review of systems was performed and was negative except as noted above.    I personally reviewed allergies, past medical, surgical, social and family history and updated as appropriate.    Medications:  Current Medications[1]     Health Maintenance:  Immunization History   Administered Date(s) Administered    Td (ADULT) 06/01/2018    Tdap 08/25/2016, 12/01/2023      Health Maintenance   Topic Date Due    COVID-19 Vaccine (1 - 2024-25 season) 01/15/2026 (Originally 9/1/2024)    Hemoglobin A1c (Diabetic Prevention Screening)  01/15/2028    Lipid Panel  01/15/2030    TETANUS VACCINE  12/01/2033    RSV Vaccine (Age 60+ and Pregnant patients) (1 - 1-dose 75+ series) 06/29/2058    Hepatitis C Screening  Completed    HIV Screening  Completed    Pneumococcal Vaccines (Age 0-49)  Aged Out    Influenza Vaccine  Discontinued     Health Maintenance Topics with due status: Not Due       Topic Last Completion Date    TETANUS VACCINE 12/01/2023    Hemoglobin A1c (Diabetic Prevention Screening) 01/15/2025    Lipid Panel 01/15/2025    RSV Vaccine (Age 60+ and Pregnant patients) Not Due     There are no preventive care reminders to display for this patient.    PHYSICAL EXAM:  Vitals:    04/10/25 1535 04/10/25 1536   BP: (!) 147/87 (!) 140/83   Pulse: 87    SpO2: 97%    Weight: 88.6 kg (195 lb 6.4 oz)    Height: 5' 10" (1.778 m)      Body mass index is 28.04 kg/m².  Physical Exam  Constitutional:       " Appearance: Normal appearance. He is normal weight.   HENT:      Head: Normocephalic.      Nose: Nose normal.      Mouth/Throat:      Mouth: Mucous membranes are moist.   Cardiovascular:      Rate and Rhythm: Normal rate and regular rhythm.      Heart sounds: Normal heart sounds.   Pulmonary:      Effort: Pulmonary effort is normal.      Breath sounds: Normal breath sounds.   Musculoskeletal:         General: Normal range of motion.      Cervical back: Normal range of motion.   Skin:     General: Skin is warm and dry.   Neurological:      General: No focal deficit present.      Mental Status: He is alert and oriented to person, place, and time.          ASSESSMENT/PLAN:  1. Attention deficit hyperactivity disorder (ADHD), combined type  -     dextroamphetamine-amphetamine (ADDERALL) 30 mg Tab; Take 1 tablet (30 mg total) by mouth once daily.  Dispense: 30 tablet; Refill: 0  -     dextroamphetamine-amphetamine (ADDERALL) 10 mg Tab; Take 1 tablet (10 mg total) by mouth after lunch.  Dispense: 30 tablet; Refill: 0        Other than changes above, continue current medications and maintain follow up with specialists.      No follow-ups on file.   No results found for this or any previous visit (from the past 12 weeks).      KIRT Bailey  [facility]                       [1]   Current Outpatient Medications:     cyclobenzaprine (FLEXERIL) 10 MG tablet, Take 1 tablet (10 mg total) by mouth 2 (two) times daily as needed for Muscle spasms., Disp: 20 tablet, Rfl: 0    dextroamphetamine-amphetamine (ADDERALL) 10 mg Tab, Take 1 tablet (10 mg total) by mouth after lunch., Disp: 30 tablet, Rfl: 0    dextroamphetamine-amphetamine (ADDERALL) 30 mg Tab, Take 1 tablet (30 mg total) by mouth once daily., Disp: 30 tablet, Rfl: 0    ibuprofen (ADVIL,MOTRIN) 800 MG tablet, Take 1 tablet (800 mg total) by mouth every 8 (eight) hours as needed for Pain., Disp: 30 tablet, Rfl: 0    magnesium gluconate 27.5 mg magne- sium  (500 mg) Tab, Take 500 mg by mouth once daily., Disp: , Rfl:     pantoprazole (PROTONIX) 40 MG tablet, Take 1 tablet (40 mg total) by mouth once daily., Disp: 30 tablet, Rfl: 11    predniSONE (DELTASONE) 10 MG tablet, Take 1 tablet (10 mg total) by mouth As instructed (take 2 tabs day 1, 2 tabs on day 2, then 1 tab on day 3, day 4, and day 5)., Disp: 7 tablet, Rfl: 0    sumatriptan (IMITREX) 50 MG tablet, Take 1 tablet (50 mg total) by mouth once. for 1 dose, Disp: 9 tablet, Rfl: 0    vitamin D (VITAMIN D3) 1000 units Tab, Take 1,000 Units by mouth once daily., Disp: , Rfl:

## 2025-04-11 DIAGNOSIS — F90.2 ATTENTION DEFICIT HYPERACTIVITY DISORDER (ADHD), COMBINED TYPE: ICD-10-CM

## 2025-04-11 RX ORDER — DEXTROAMPHETAMINE SACCHARATE, AMPHETAMINE ASPARTATE, DEXTROAMPHETAMINE SULFATE AND AMPHETAMINE SULFATE 7.5; 7.5; 7.5; 7.5 MG/1; MG/1; MG/1; MG/1
30 TABLET ORAL DAILY
Qty: 30 TABLET | Refills: 0 | Status: CANCELLED | OUTPATIENT
Start: 2025-04-11

## 2025-04-15 ENCOUNTER — TELEPHONE (OUTPATIENT)
Dept: PRIMARY CARE CLINIC | Facility: CLINIC | Age: 40
End: 2025-04-15
Payer: MEDICAID

## 2025-04-28 DIAGNOSIS — K21.9 GASTROESOPHAGEAL REFLUX DISEASE WITHOUT ESOPHAGITIS: ICD-10-CM

## 2025-04-28 RX ORDER — PANTOPRAZOLE SODIUM 40 MG/1
40 TABLET, DELAYED RELEASE ORAL DAILY
Qty: 30 TABLET | Refills: 11 | Status: SHIPPED | OUTPATIENT
Start: 2025-04-28

## 2025-04-30 DIAGNOSIS — G43.909 MIGRAINE WITHOUT STATUS MIGRAINOSUS, NOT INTRACTABLE, UNSPECIFIED MIGRAINE TYPE: ICD-10-CM

## 2025-04-30 RX ORDER — SUMATRIPTAN SUCCINATE 50 MG/1
50 TABLET ORAL ONCE
Qty: 9 TABLET | Refills: 0 | Status: SHIPPED | OUTPATIENT
Start: 2025-04-30 | End: 2025-04-30

## 2025-05-06 ENCOUNTER — TELEPHONE (OUTPATIENT)
Dept: PRIMARY CARE CLINIC | Facility: CLINIC | Age: 40
End: 2025-05-06
Payer: MEDICAID

## 2025-05-06 DIAGNOSIS — F90.2 ATTENTION DEFICIT HYPERACTIVITY DISORDER (ADHD), COMBINED TYPE: ICD-10-CM

## 2025-05-06 RX ORDER — DEXTROAMPHETAMINE SACCHARATE, AMPHETAMINE ASPARTATE, DEXTROAMPHETAMINE SULFATE AND AMPHETAMINE SULFATE 2.5; 2.5; 2.5; 2.5 MG/1; MG/1; MG/1; MG/1
10 TABLET ORAL
Qty: 30 TABLET | Refills: 0 | Status: SHIPPED | OUTPATIENT
Start: 2025-05-06

## 2025-05-06 RX ORDER — DEXTROAMPHETAMINE SACCHARATE, AMPHETAMINE ASPARTATE, DEXTROAMPHETAMINE SULFATE AND AMPHETAMINE SULFATE 7.5; 7.5; 7.5; 7.5 MG/1; MG/1; MG/1; MG/1
30 TABLET ORAL DAILY
Qty: 30 TABLET | Refills: 0 | Status: SHIPPED | OUTPATIENT
Start: 2025-05-06

## 2025-05-19 ENCOUNTER — OFFICE VISIT (OUTPATIENT)
Dept: PRIMARY CARE CLINIC | Facility: CLINIC | Age: 40
End: 2025-05-19
Payer: MEDICAID

## 2025-05-19 VITALS
BODY MASS INDEX: 27.9 KG/M2 | HEIGHT: 70 IN | WEIGHT: 194.88 LBS | RESPIRATION RATE: 16 BRPM | HEART RATE: 91 BPM | SYSTOLIC BLOOD PRESSURE: 142 MMHG | DIASTOLIC BLOOD PRESSURE: 75 MMHG | TEMPERATURE: 99 F | OXYGEN SATURATION: 97 %

## 2025-05-19 DIAGNOSIS — F90.2 ATTENTION DEFICIT HYPERACTIVITY DISORDER (ADHD), COMBINED TYPE: Primary | ICD-10-CM

## 2025-05-19 PROCEDURE — 99213 OFFICE O/P EST LOW 20 MIN: CPT | Mod: S$PBB,,, | Performed by: NURSE PRACTITIONER

## 2025-05-19 PROCEDURE — 99213 OFFICE O/P EST LOW 20 MIN: CPT | Mod: PBBFAC | Performed by: NURSE PRACTITIONER

## 2025-05-19 PROCEDURE — 3077F SYST BP >= 140 MM HG: CPT | Mod: CPTII,,, | Performed by: NURSE PRACTITIONER

## 2025-05-19 PROCEDURE — 99999 PR PBB SHADOW E&M-EST. PATIENT-LVL III: CPT | Mod: PBBFAC,,, | Performed by: NURSE PRACTITIONER

## 2025-05-19 PROCEDURE — 3078F DIAST BP <80 MM HG: CPT | Mod: CPTII,,, | Performed by: NURSE PRACTITIONER

## 2025-05-19 PROCEDURE — 3044F HG A1C LEVEL LT 7.0%: CPT | Mod: CPTII,,, | Performed by: NURSE PRACTITIONER

## 2025-05-19 PROCEDURE — 3008F BODY MASS INDEX DOCD: CPT | Mod: CPTII,,, | Performed by: NURSE PRACTITIONER

## 2025-05-19 PROCEDURE — 1159F MED LIST DOCD IN RCRD: CPT | Mod: CPTII,,, | Performed by: NURSE PRACTITIONER

## 2025-05-19 RX ORDER — ORPHENADRINE CITRATE, ASPIRIN AND CAFFEINE 385; 30; 25 MG/1; MG/1; MG/1
1-2 TABLET ORAL
COMMUNITY
Start: 2025-05-14

## 2025-05-19 RX ORDER — TOLMETIN SODIUM 400 MG/1
400 CAPSULE ORAL
COMMUNITY
Start: 2025-05-12

## 2025-05-19 RX ORDER — DEXTROAMPHETAMINE SACCHARATE, AMPHETAMINE ASPARTATE, DEXTROAMPHETAMINE SULFATE AND AMPHETAMINE SULFATE 7.5; 7.5; 7.5; 7.5 MG/1; MG/1; MG/1; MG/1
30 TABLET ORAL DAILY
Qty: 30 TABLET | Refills: 0 | Status: SHIPPED | OUTPATIENT
Start: 2025-05-19

## 2025-05-19 RX ORDER — DEXTROAMPHETAMINE SACCHARATE, AMPHETAMINE ASPARTATE, DEXTROAMPHETAMINE SULFATE AND AMPHETAMINE SULFATE 5; 5; 5; 5 MG/1; MG/1; MG/1; MG/1
1 TABLET ORAL
Qty: 30 TABLET | Refills: 0 | Status: SHIPPED | OUTPATIENT
Start: 2025-05-19

## 2025-05-19 NOTE — PROGRESS NOTES
[facility] Note    HPI:  Syed Cm is a 39 y.o. male who presents today for Follow-up and Medication Refill (Pt here for 3 mo med refill)    Would like to increase afternoon dose of ADHD med, feels like the 10 mg is wearing off    Review of Systems   Constitutional: Negative.    HENT: Negative.     Eyes: Negative.    Respiratory: Negative.     Cardiovascular: Negative.    Gastrointestinal: Negative.    Genitourinary: Negative.    Musculoskeletal: Negative.    Skin: Negative.    Neurological: Negative.    Endo/Heme/Allergies: Negative.    Psychiatric/Behavioral: Negative.        A review of systems was performed and was negative except as noted above.    I personally reviewed allergies, past medical, surgical, social and family history and updated as appropriate.    Medications:  Current Medications[1]     Health Maintenance:  Immunization History   Administered Date(s) Administered    Td (ADULT) 06/01/2018    Tdap 08/25/2016, 12/01/2023      Health Maintenance   Topic Date Due    COVID-19 Vaccine (1 - 2024-25 season) 01/15/2026 (Originally 9/1/2024)    Hemoglobin A1c (Diabetic Prevention Screening)  01/15/2028    Lipid Panel  01/15/2030    TETANUS VACCINE  12/01/2033    RSV Vaccine (Age 60+ and Pregnant patients) (1 - 1-dose 75+ series) 06/29/2058    Hepatitis C Screening  Completed    HIV Screening  Completed    Pneumococcal Vaccines (Age 0-49)  Aged Out    Influenza Vaccine  Discontinued     Health Maintenance Topics with due status: Not Due       Topic Last Completion Date    TETANUS VACCINE 12/01/2023    Hemoglobin A1c (Diabetic Prevention Screening) 01/15/2025    Lipid Panel 01/15/2025    RSV Vaccine (Age 60+ and Pregnant patients) Not Due     There are no preventive care reminders to display for this patient.    PHYSICAL EXAM:  Vitals:    05/19/25 0900 05/19/25 0903   BP: (!) 144/76 (!) 142/75   BP Location: Left arm Left arm   Patient Position: Sitting Sitting   Pulse: 91    Resp:  "16    Temp: 98.6 °F (37 °C)    TempSrc: Temporal    SpO2: 97%    Weight: 88.4 kg (194 lb 14.4 oz)    Height: 5' 10" (1.778 m)      Body mass index is 27.97 kg/m².  Physical Exam  Constitutional:       Appearance: Normal appearance. He is normal weight.   HENT:      Head: Normocephalic.      Nose: Nose normal.      Mouth/Throat:      Mouth: Mucous membranes are moist.   Cardiovascular:      Rate and Rhythm: Normal rate and regular rhythm.      Heart sounds: Normal heart sounds.   Pulmonary:      Effort: Pulmonary effort is normal.      Breath sounds: Normal breath sounds.   Musculoskeletal:         General: Normal range of motion.      Cervical back: Normal range of motion.   Skin:     General: Skin is warm and dry.   Neurological:      General: No focal deficit present.      Mental Status: He is alert and oriented to person, place, and time.          ASSESSMENT/PLAN:  1. Attention deficit hyperactivity disorder (ADHD), combined type  -     dextroamphetamine-amphetamine (ADDERALL) 30 mg Tab; Take 1 tablet (30 mg total) by mouth once daily.  Dispense: 30 tablet; Refill: 0  -     dextroamphetamine-amphetamine (ADDERALL) 20 mg tablet; Take 1 tablet by mouth after lunch.  Dispense: 30 tablet; Refill: 0        Other than changes above, continue current medications and maintain follow up with specialists.      Follow up in about 3 months (around 8/19/2025) for ADHD.   No results found for this or any previous visit (from the past 12 weeks).      KIRT Bailey  [facility]                       [1]   Current Outpatient Medications:     dextroamphetamine-amphetamine (ADDERALL) 10 mg Tab, Take 1 tablet (10 mg total) by mouth after lunch., Disp: 30 tablet, Rfl: 0    ibuprofen (ADVIL,MOTRIN) 800 MG tablet, Take 1 tablet (800 mg total) by mouth every 8 (eight) hours as needed for Pain., Disp: 30 tablet, Rfl: 0    magnesium gluconate 27.5 mg magne- sium (500 mg) Tab, Take 500 mg by mouth once daily., Disp: , Rfl: "     orphenadrine-asa-caffeine -30mg -30 mg Tab, Take 1-2 tablets by mouth., Disp: , Rfl:     pantoprazole (PROTONIX) 40 MG tablet, Take 1 tablet (40 mg total) by mouth once daily., Disp: 30 tablet, Rfl: 11    sumatriptan (IMITREX) 50 MG tablet, Take 1 tablet (50 mg total) by mouth once. for 1 dose, Disp: 9 tablet, Rfl: 0    tolmetin (TOLECTIN) 400 mg capsule, Take 400 mg by mouth., Disp: , Rfl:     vitamin D (VITAMIN D3) 1000 units Tab, Take 1,000 Units by mouth once daily., Disp: , Rfl:     dextroamphetamine-amphetamine (ADDERALL) 20 mg tablet, Take 1 tablet by mouth after lunch., Disp: 30 tablet, Rfl: 0    dextroamphetamine-amphetamine (ADDERALL) 30 mg Tab, Take 1 tablet (30 mg total) by mouth once daily., Disp: 30 tablet, Rfl: 0    predniSONE (DELTASONE) 10 MG tablet, Take 1 tablet (10 mg total) by mouth As instructed (take 2 tabs day 1, 2 tabs on day 2, then 1 tab on day 3, day 4, and day 5). (Patient not taking: Reported on 5/19/2025), Disp: 7 tablet, Rfl: 0

## 2025-06-05 DIAGNOSIS — F90.2 ATTENTION DEFICIT HYPERACTIVITY DISORDER (ADHD), COMBINED TYPE: ICD-10-CM

## 2025-06-05 RX ORDER — DEXTROAMPHETAMINE SACCHARATE, AMPHETAMINE ASPARTATE, DEXTROAMPHETAMINE SULFATE AND AMPHETAMINE SULFATE 5; 5; 5; 5 MG/1; MG/1; MG/1; MG/1
1 TABLET ORAL
Qty: 30 TABLET | Refills: 0 | Status: SHIPPED | OUTPATIENT
Start: 2025-06-05

## 2025-06-05 RX ORDER — DEXTROAMPHETAMINE SACCHARATE, AMPHETAMINE ASPARTATE, DEXTROAMPHETAMINE SULFATE AND AMPHETAMINE SULFATE 7.5; 7.5; 7.5; 7.5 MG/1; MG/1; MG/1; MG/1
30 TABLET ORAL DAILY
Qty: 30 TABLET | Refills: 0 | Status: SHIPPED | OUTPATIENT
Start: 2025-06-05

## 2025-06-15 DIAGNOSIS — G43.909 MIGRAINE WITHOUT STATUS MIGRAINOSUS, NOT INTRACTABLE, UNSPECIFIED MIGRAINE TYPE: ICD-10-CM

## 2025-06-16 RX ORDER — SUMATRIPTAN SUCCINATE 50 MG/1
50 TABLET ORAL ONCE
Qty: 9 TABLET | Refills: 0 | Status: SHIPPED | OUTPATIENT
Start: 2025-06-16 | End: 2025-06-16

## 2025-06-25 ENCOUNTER — TELEPHONE (OUTPATIENT)
Dept: PRIMARY CARE CLINIC | Facility: CLINIC | Age: 40
End: 2025-06-25
Payer: MEDICAID

## 2025-06-25 DIAGNOSIS — F90.2 ATTENTION DEFICIT HYPERACTIVITY DISORDER (ADHD), COMBINED TYPE: Primary | ICD-10-CM

## 2025-06-26 RX ORDER — DEXTROAMPHETAMINE SACCHARATE, AMPHETAMINE ASPARTATE, DEXTROAMPHETAMINE SULFATE AND AMPHETAMINE SULFATE 7.5; 7.5; 7.5; 7.5 MG/1; MG/1; MG/1; MG/1
30 TABLET ORAL 2 TIMES DAILY
Qty: 60 TABLET | Refills: 0 | Status: SHIPPED | OUTPATIENT
Start: 2025-06-26

## 2025-06-30 DIAGNOSIS — F90.2 ATTENTION DEFICIT HYPERACTIVITY DISORDER (ADHD), COMBINED TYPE: ICD-10-CM

## 2025-06-30 RX ORDER — DEXTROAMPHETAMINE SACCHARATE, AMPHETAMINE ASPARTATE, DEXTROAMPHETAMINE SULFATE AND AMPHETAMINE SULFATE 7.5; 7.5; 7.5; 7.5 MG/1; MG/1; MG/1; MG/1
30 TABLET ORAL 2 TIMES DAILY
Qty: 60 TABLET | Refills: 0 | Status: SHIPPED | OUTPATIENT
Start: 2025-06-30

## 2025-07-22 ENCOUNTER — OFFICE VISIT (OUTPATIENT)
Dept: ORTHOPEDICS | Facility: CLINIC | Age: 40
End: 2025-07-22
Payer: MEDICAID

## 2025-07-22 DIAGNOSIS — M25.561 ACUTE PAIN OF RIGHT KNEE: Primary | ICD-10-CM

## 2025-07-22 DIAGNOSIS — M25.461 KNEE EFFUSION, RIGHT: ICD-10-CM

## 2025-07-22 PROCEDURE — 99999 PR PBB SHADOW E&M-EST. PATIENT-LVL II: CPT | Mod: PBBFAC,,, | Performed by: NURSE PRACTITIONER

## 2025-07-22 PROCEDURE — 99212 OFFICE O/P EST SF 10 MIN: CPT | Mod: PBBFAC | Performed by: NURSE PRACTITIONER

## 2025-07-22 PROCEDURE — 1159F MED LIST DOCD IN RCRD: CPT | Mod: CPTII,,, | Performed by: NURSE PRACTITIONER

## 2025-07-22 PROCEDURE — 99213 OFFICE O/P EST LOW 20 MIN: CPT | Mod: S$PBB,,, | Performed by: NURSE PRACTITIONER

## 2025-07-22 PROCEDURE — 3044F HG A1C LEVEL LT 7.0%: CPT | Mod: CPTII,,, | Performed by: NURSE PRACTITIONER

## 2025-07-22 PROCEDURE — 1160F RVW MEDS BY RX/DR IN RCRD: CPT | Mod: CPTII,,, | Performed by: NURSE PRACTITIONER

## 2025-07-22 PROCEDURE — 97110 THERAPEUTIC EXERCISES: CPT | Mod: PBBFAC | Performed by: NURSE PRACTITIONER

## 2025-07-22 RX ORDER — PREDNISONE 10 MG/1
10 TABLET ORAL SEE ADMIN INSTRUCTIONS
Qty: 7 TABLET | Refills: 0 | Status: SHIPPED | OUTPATIENT
Start: 2025-07-22

## 2025-07-22 NOTE — PROGRESS NOTES
New problem visit: Right Knee injury:     Patient ID: Syed Cm is a 40 y.o. male here for a new problem of right knee pain.      Chief Complaint: RT knee injury     Location: RT knee     HPI:  Patient presents for evaluation and treatment of an injury involving the right knee. DOI about 2-3 weeks ago. He states that he standing on scaffolding and his RT knee slipped and impacted the posterior aspect of the knee.  He had immediate pain. He states that the knee has been with posterior knee soreness. He reports mild improvement since onset but is concern due to a popping sensation in the knee with ROM and weight bearing. He denies locking but feels like the knee may give out. He has self treated.     He rates his pain as 4/10, describes as aching. The pain is over the posterior knee. He was treated with an Nsaid. He is noted with a limp and states that he has been weight bearing since the injury. He has no difficulty to fully extend and flex the knee. Pain is aggravated by any weight bearing and ROM.      Medical History:  Past Medical History:   Diagnosis Date    Hiatal hernia        Surgical History:  Past Surgical History:   Procedure Laterality Date    ULNAR NERVE REPAIR Right        Family History:  No family history on file.    Allergies:   Review of patient's allergies indicates:  No Known Allergies    Review of Systems  Respiratory: Negative for shortness of breath.    Cardiovascular: Negative for chest pain.  Gastrointestinal: Negative for abdominal pain.  Neurological: Negative for headaches.   Musculoskeletal ROS: positive for - RT knee joint pain  All other systems reviewed and are negative.        Objective:      Antalgic gait- mild     Physical Exam:  Alignment: normal     ROM:   L - Flexion - 125 degrees,    Extension - 0 degrees  R - Flexion - 120 degrees,    Extension - 0 degrees  L- Strength:  Extension - 4/5      Flexion - 4/5  R - Strength:  Extension - 5/5      Flexion - 5/5  Effusion:  trace     Pat/Fem:   Crepitus - neg  Patellar grind test - negative   Patella tenderness -negative  Tibial tubercle tenderness - negative  Apprehension - not checked  Illiotibial band tenderness - negative   Anserine bursa tenderness - negative   Quad lift intact.      Ligaments: (RT Knee)  MCL: Medial opening @ 0:negative      @ 30: negative  Pain with palpation -negative  LCL: Lateral opening @0: negative      @ 30: negative  Pain with palpation - negative     Ant Drawer - negative  PCL:  Sag - negative           Post Drawer - negative     Meniscus:  RT Knee  JL tenderness -posterior joint line tenderness  Yayo -guarded     Popliteal mass - negative B/L     General appearance: NAD  Peripheral pulses: normal  Skin examination: no erythema   Reflexes: normal  Mood and Effect: normal  Palpation of lymph nodes: normal  Coordination: normal  Sensation: normal     Xray:  RT Knee series pending     Assessment:      RT knee pain, suspected mild sprain     Plan:        Start directed physician guided exercises for ROM and strengthening- [employer] knee conditioning packet given with exercises reviewed. HEP 63551 - He was instructed and demonstrated exercises per [employer] knee rehab exercises for HEP which include Heel cord stretch, standing quad stretch, supine hamstring stretch, half squats, hamstring curls, calf raises, leg extensions, straight leg raises, hip abduction, hip adduction and  leg presses. The patient then demonstrated understanding of exercises and proper technique. This program was performed for 15 minutes.    RT knee radiographs are pending.   Rx- Prednisone 10 mg po daily as directed. Continue Nsaid as previous.   Ice, ACE compression and OTC bracing prn.   5.   RTC 2 weeks for follow up. May need MRI if mechanical symptoms progress.  6.   He had no further questions.

## 2025-07-29 DIAGNOSIS — F90.2 ATTENTION DEFICIT HYPERACTIVITY DISORDER (ADHD), COMBINED TYPE: ICD-10-CM

## 2025-07-29 RX ORDER — DEXTROAMPHETAMINE SACCHARATE, AMPHETAMINE ASPARTATE, DEXTROAMPHETAMINE SULFATE AND AMPHETAMINE SULFATE 7.5; 7.5; 7.5; 7.5 MG/1; MG/1; MG/1; MG/1
30 TABLET ORAL 2 TIMES DAILY
Qty: 60 TABLET | Refills: 0 | Status: SHIPPED | OUTPATIENT
Start: 2025-07-29

## 2025-08-04 ENCOUNTER — HOSPITAL ENCOUNTER (OUTPATIENT)
Dept: RADIOLOGY | Facility: HOSPITAL | Age: 40
Discharge: HOME | End: 2025-08-04
Attending: NURSE PRACTITIONER
Payer: MEDICAID

## 2025-08-04 ENCOUNTER — TELEPHONE (OUTPATIENT)
Dept: ORTHOPEDICS | Facility: CLINIC | Age: 40
End: 2025-08-04
Payer: MEDICAID

## 2025-08-04 DIAGNOSIS — M25.561 ACUTE PAIN OF RIGHT KNEE: ICD-10-CM

## 2025-08-04 DIAGNOSIS — M25.461 KNEE EFFUSION, RIGHT: ICD-10-CM

## 2025-08-04 PROCEDURE — 73562 X-RAY EXAM OF KNEE 3: CPT | Mod: TC,RT

## 2025-08-05 ENCOUNTER — OFFICE VISIT (OUTPATIENT)
Dept: ORTHOPEDICS | Facility: CLINIC | Age: 40
End: 2025-08-05
Payer: MEDICAID

## 2025-08-05 DIAGNOSIS — M25.461 KNEE EFFUSION, RIGHT: ICD-10-CM

## 2025-08-05 DIAGNOSIS — M62.830 MUSCLE SPASM OF BACK: ICD-10-CM

## 2025-08-05 DIAGNOSIS — M25.561 ACUTE PAIN OF RIGHT KNEE: Primary | ICD-10-CM

## 2025-08-05 PROCEDURE — 99212 OFFICE O/P EST SF 10 MIN: CPT | Mod: PBBFAC | Performed by: NURSE PRACTITIONER

## 2025-08-05 PROCEDURE — 99999 PR PBB SHADOW E&M-EST. PATIENT-LVL II: CPT | Mod: PBBFAC,,, | Performed by: NURSE PRACTITIONER

## 2025-08-05 PROCEDURE — 1159F MED LIST DOCD IN RCRD: CPT | Mod: CPTII,,, | Performed by: NURSE PRACTITIONER

## 2025-08-05 PROCEDURE — 3044F HG A1C LEVEL LT 7.0%: CPT | Mod: CPTII,,, | Performed by: NURSE PRACTITIONER

## 2025-08-05 PROCEDURE — 99213 OFFICE O/P EST LOW 20 MIN: CPT | Mod: S$PBB,,, | Performed by: NURSE PRACTITIONER

## 2025-08-05 PROCEDURE — 1160F RVW MEDS BY RX/DR IN RCRD: CPT | Mod: CPTII,,, | Performed by: NURSE PRACTITIONER

## 2025-08-05 NOTE — PROGRESS NOTES
Follow up visit: Right Knee injury:     Patient ID: Syed Cm is a 40 y.o. male here for follow up for right knee pain.      Chief Complaint: RT knee injury     Location: RT knee     HPI:  Patient presents for follow up for an injury involving the right knee. DOI about 4 weeks ago. He presents and states that the knee is doing better after taking the prednisone and Nsaid. He states that the pain and swelling has decreased. He continues with occasional popping sensation in the knee with ROM and weight bearing. He denies locking and denies any instability since our last visit.      He rates his pain as 2/10. He is noted with a minimal limp and has been weight bearing. He has no difficulty to fully extend and flex the knee. He has used the bracing as needed. He states that he has been dealing with RT sided sciatica.      Medical History:       Past Medical History:   Diagnosis Date    Hiatal hernia           Surgical History:        Past Surgical History:   Procedure Laterality Date    ULNAR NERVE REPAIR Right           Family History:  No family history on file.     Allergies:   Review of patient's allergies indicates:  No Known Allergies     Review of Systems  Respiratory: Negative for shortness of breath.    Cardiovascular: Negative for chest pain.  Gastrointestinal: Negative for abdominal pain.  Neurological: Negative for headaches.   Musculoskeletal ROS: positive for - RT knee joint pain  All other systems reviewed and are negative.        Objective:      Antalgic gait- mild     Physical Exam:  Alignment: normal     ROM:   L - Flexion - 125 degrees,    Extension - 0 degrees  R - Flexion - 120 degrees,    Extension - 0 degrees  L- Strength:  Extension - 5/5      Flexion - 5/5  R - Strength:  Extension - 5/5      Flexion - 5/5  Effusion: trace     Pat/Fem:   Crepitus - neg  Patellar grind test - negative   Patella tenderness -negative  Tibial tubercle tenderness - negative  Apprehension - not  checked  Illiotibial band tenderness - negative   Anserine bursa tenderness - negative   Quad lift intact.      Ligaments: (RT Knee)  MCL: Medial opening @ 0:negative      @ 30: negative  Pain with palpation -negative  LCL: Lateral opening @0: negative      @ 30: negative  Pain with palpation - negative     Ant Drawer - negative  PCL:  Sag - negative           Post Drawer - negative     Meniscus:  RT Knee  JL tenderness -posterior joint line tenderness- mild  Yayo -neg     Popliteal mass - negative B/L     General appearance: NAD  Peripheral pulses: normal  Skin examination: no erythema   Reflexes: normal  Mood and Effect: normal  Palpation of lymph nodes: normal  Coordination: normal  Sensation: normal     Xray:  RT Knee series reviewed from 08/04/25  No fracture or dislocation.  Soft tissues unremarkable.     Impression:     No acute finding.        Assessment:      RT knee pain, suspected mild sprain     Plan:         He is noted with mild to moderate clinical improvement.   Radiographs reviewed, no acute findings.   Continue the directed physician guided exercises for ROM and strengthening.    Continue Nsaid as previous.   Ice, ACE compression and OTC bracing prn.   5.   RTC 4 weeks for follow up. May need MRI if mechanical symptoms progress.  6.   He had no further questions.

## 2025-08-19 ENCOUNTER — OFFICE VISIT (OUTPATIENT)
Dept: PRIMARY CARE CLINIC | Facility: CLINIC | Age: 40
End: 2025-08-19
Payer: MEDICAID

## 2025-08-19 VITALS
TEMPERATURE: 99 F | BODY MASS INDEX: 27.77 KG/M2 | DIASTOLIC BLOOD PRESSURE: 80 MMHG | WEIGHT: 194 LBS | HEIGHT: 70 IN | OXYGEN SATURATION: 97 % | HEART RATE: 84 BPM | SYSTOLIC BLOOD PRESSURE: 138 MMHG | RESPIRATION RATE: 16 BRPM

## 2025-08-19 DIAGNOSIS — F90.2 ATTENTION DEFICIT HYPERACTIVITY DISORDER (ADHD), COMBINED TYPE: Primary | ICD-10-CM

## 2025-08-19 PROCEDURE — 1160F RVW MEDS BY RX/DR IN RCRD: CPT | Mod: CPTII,,, | Performed by: NURSE PRACTITIONER

## 2025-08-19 PROCEDURE — 99212 OFFICE O/P EST SF 10 MIN: CPT | Mod: S$PBB,,, | Performed by: NURSE PRACTITIONER

## 2025-08-19 PROCEDURE — 99213 OFFICE O/P EST LOW 20 MIN: CPT | Mod: PBBFAC | Performed by: NURSE PRACTITIONER

## 2025-08-19 PROCEDURE — 3079F DIAST BP 80-89 MM HG: CPT | Mod: CPTII,,, | Performed by: NURSE PRACTITIONER

## 2025-08-19 PROCEDURE — 3044F HG A1C LEVEL LT 7.0%: CPT | Mod: CPTII,,, | Performed by: NURSE PRACTITIONER

## 2025-08-19 PROCEDURE — 1159F MED LIST DOCD IN RCRD: CPT | Mod: CPTII,,, | Performed by: NURSE PRACTITIONER

## 2025-08-19 PROCEDURE — 99999 PR PBB SHADOW E&M-EST. PATIENT-LVL III: CPT | Mod: PBBFAC,,, | Performed by: NURSE PRACTITIONER

## 2025-08-19 PROCEDURE — 3008F BODY MASS INDEX DOCD: CPT | Mod: CPTII,,, | Performed by: NURSE PRACTITIONER

## 2025-08-19 PROCEDURE — 3075F SYST BP GE 130 - 139MM HG: CPT | Mod: CPTII,,, | Performed by: NURSE PRACTITIONER

## 2025-08-19 RX ORDER — DEXTROAMPHETAMINE SACCHARATE, AMPHETAMINE ASPARTATE, DEXTROAMPHETAMINE SULFATE AND AMPHETAMINE SULFATE 7.5; 7.5; 7.5; 7.5 MG/1; MG/1; MG/1; MG/1
30 TABLET ORAL 2 TIMES DAILY
Qty: 60 TABLET | Refills: 0 | Status: SHIPPED | OUTPATIENT
Start: 2025-08-19

## 2025-08-21 DIAGNOSIS — F90.2 ATTENTION DEFICIT HYPERACTIVITY DISORDER (ADHD), COMBINED TYPE: ICD-10-CM

## 2025-08-21 RX ORDER — DEXTROAMPHETAMINE SACCHARATE, AMPHETAMINE ASPARTATE, DEXTROAMPHETAMINE SULFATE AND AMPHETAMINE SULFATE 7.5; 7.5; 7.5; 7.5 MG/1; MG/1; MG/1; MG/1
30 TABLET ORAL 2 TIMES DAILY
Qty: 60 TABLET | Refills: 0 | OUTPATIENT
Start: 2025-08-21

## 2025-08-22 DIAGNOSIS — F90.2 ATTENTION DEFICIT HYPERACTIVITY DISORDER (ADHD), COMBINED TYPE: ICD-10-CM

## 2025-08-22 RX ORDER — DEXTROAMPHETAMINE SACCHARATE, AMPHETAMINE ASPARTATE, DEXTROAMPHETAMINE SULFATE AND AMPHETAMINE SULFATE 7.5; 7.5; 7.5; 7.5 MG/1; MG/1; MG/1; MG/1
30 TABLET ORAL 2 TIMES DAILY
Qty: 60 TABLET | Refills: 0 | OUTPATIENT
Start: 2025-08-22

## 2025-08-23 DIAGNOSIS — G43.909 MIGRAINE WITHOUT STATUS MIGRAINOSUS, NOT INTRACTABLE, UNSPECIFIED MIGRAINE TYPE: ICD-10-CM

## 2025-08-25 RX ORDER — SUMATRIPTAN SUCCINATE 50 MG/1
50 TABLET ORAL ONCE
Qty: 9 TABLET | Refills: 0 | Status: SHIPPED | OUTPATIENT
Start: 2025-08-25 | End: 2025-08-25